# Patient Record
Sex: MALE | Race: WHITE | NOT HISPANIC OR LATINO | Employment: UNEMPLOYED | URBAN - METROPOLITAN AREA
[De-identification: names, ages, dates, MRNs, and addresses within clinical notes are randomized per-mention and may not be internally consistent; named-entity substitution may affect disease eponyms.]

---

## 2021-02-01 ENCOUNTER — HOSPITAL ENCOUNTER (EMERGENCY)
Facility: HOSPITAL | Age: 31
Discharge: OTHER INSTITUTION | End: 2021-02-03
Attending: EMERGENCY MEDICINE | Admitting: EMERGENCY MEDICINE
Payer: COMMERCIAL

## 2021-02-01 DIAGNOSIS — R45.851 SUICIDAL IDEATIONS: Primary | ICD-10-CM

## 2021-02-01 LAB
ALBUMIN SERPL BCP-MCNC: 4.8 G/DL (ref 3.5–5.7)
ALP SERPL-CCNC: 70 U/L (ref 40–150)
ALT SERPL W P-5'-P-CCNC: 26 U/L (ref 7–52)
AMPHETAMINES SERPL QL SCN: NEGATIVE
ANION GAP SERPL CALCULATED.3IONS-SCNC: 10 MMOL/L (ref 4–13)
AST SERPL W P-5'-P-CCNC: 18 U/L (ref 13–39)
BACTERIA UR QL AUTO: NORMAL /HPF
BARBITURATES UR QL: NEGATIVE
BASOPHILS # BLD AUTO: 0 THOUSANDS/ΜL (ref 0–0.1)
BASOPHILS NFR BLD AUTO: 0 % (ref 0–2)
BENZODIAZ UR QL: NEGATIVE
BILIRUB SERPL-MCNC: 0.5 MG/DL (ref 0.2–1)
BILIRUB UR QL STRIP: ABNORMAL
BUN SERPL-MCNC: 11 MG/DL (ref 7–25)
CALCIUM SERPL-MCNC: 9.6 MG/DL (ref 8.6–10.5)
CHLORIDE SERPL-SCNC: 105 MMOL/L (ref 98–107)
CLARITY UR: CLEAR
CO2 SERPL-SCNC: 24 MMOL/L (ref 21–31)
COCAINE UR QL: NEGATIVE
COLOR UR: ABNORMAL
CREAT SERPL-MCNC: 0.82 MG/DL (ref 0.7–1.3)
EOSINOPHIL # BLD AUTO: 0 THOUSAND/ΜL (ref 0–0.61)
EOSINOPHIL NFR BLD AUTO: 0 % (ref 0–5)
ERYTHROCYTE [DISTWIDTH] IN BLOOD BY AUTOMATED COUNT: 13.8 % (ref 11.5–14.5)
ETHANOL EXG-MCNC: 0 MG/DL
FLUAV RNA RESP QL NAA+PROBE: NEGATIVE
FLUBV RNA RESP QL NAA+PROBE: NEGATIVE
GFR SERPL CREATININE-BSD FRML MDRD: 119 ML/MIN/1.73SQ M
GLUCOSE SERPL-MCNC: 93 MG/DL (ref 65–99)
GLUCOSE UR STRIP-MCNC: NEGATIVE MG/DL
HCT VFR BLD AUTO: 42.3 % (ref 42–47)
HGB BLD-MCNC: 13.9 G/DL (ref 14–18)
HGB UR QL STRIP.AUTO: NEGATIVE
KETONES UR STRIP-MCNC: ABNORMAL MG/DL
LEUKOCYTE ESTERASE UR QL STRIP: NEGATIVE
LYMPHOCYTES # BLD AUTO: 1.7 THOUSANDS/ΜL (ref 0.6–4.47)
LYMPHOCYTES NFR BLD AUTO: 11 % (ref 21–51)
MCH RBC QN AUTO: 28.8 PG (ref 26–34)
MCHC RBC AUTO-ENTMCNC: 32.9 G/DL (ref 31–37)
MCV RBC AUTO: 88 FL (ref 81–99)
METHADONE UR QL: NEGATIVE
MONOCYTES # BLD AUTO: 1.2 THOUSAND/ΜL (ref 0.17–1.22)
MONOCYTES NFR BLD AUTO: 8 % (ref 2–12)
NEUTROPHILS # BLD AUTO: 12.6 THOUSANDS/ΜL (ref 1.4–6.5)
NEUTS SEG NFR BLD AUTO: 81 % (ref 42–75)
NITRITE UR QL STRIP: NEGATIVE
NON-SQ EPI CELLS URNS QL MICRO: NORMAL /HPF
OPIATES UR QL SCN: NEGATIVE
OXYCODONE+OXYMORPHONE UR QL SCN: NEGATIVE
PCP UR QL: NEGATIVE
PH UR STRIP.AUTO: 6.5 [PH]
PLATELET # BLD AUTO: 437 THOUSANDS/UL (ref 149–390)
PMV BLD AUTO: 7.6 FL (ref 8.6–11.7)
POTASSIUM SERPL-SCNC: 3.4 MMOL/L (ref 3.5–5.5)
PROT SERPL-MCNC: 7.5 G/DL (ref 6.4–8.9)
PROT UR STRIP-MCNC: ABNORMAL MG/DL
RBC # BLD AUTO: 4.82 MILLION/UL (ref 4.3–5.9)
RBC #/AREA URNS AUTO: NORMAL /HPF
RSV RNA RESP QL NAA+PROBE: NEGATIVE
SARS-COV-2 RNA RESP QL NAA+PROBE: NEGATIVE
SODIUM SERPL-SCNC: 139 MMOL/L (ref 134–143)
SP GR UR STRIP.AUTO: >=1.03 (ref 1–1.03)
THC UR QL: POSITIVE
UROBILINOGEN UR QL STRIP.AUTO: 1 E.U./DL
WBC # BLD AUTO: 15.5 THOUSAND/UL (ref 4.8–10.8)
WBC #/AREA URNS AUTO: NORMAL /HPF

## 2021-02-01 PROCEDURE — 99284 EMERGENCY DEPT VISIT MOD MDM: CPT | Performed by: EMERGENCY MEDICINE

## 2021-02-01 PROCEDURE — 36415 COLL VENOUS BLD VENIPUNCTURE: CPT | Performed by: EMERGENCY MEDICINE

## 2021-02-01 PROCEDURE — 80053 COMPREHEN METABOLIC PANEL: CPT | Performed by: EMERGENCY MEDICINE

## 2021-02-01 PROCEDURE — 82075 ASSAY OF BREATH ETHANOL: CPT | Performed by: EMERGENCY MEDICINE

## 2021-02-01 PROCEDURE — 0241U HB NFCT DS VIR RESP RNA 4 TRGT: CPT | Performed by: EMERGENCY MEDICINE

## 2021-02-01 PROCEDURE — 80307 DRUG TEST PRSMV CHEM ANLYZR: CPT | Performed by: EMERGENCY MEDICINE

## 2021-02-01 PROCEDURE — 85025 COMPLETE CBC W/AUTO DIFF WBC: CPT | Performed by: EMERGENCY MEDICINE

## 2021-02-01 PROCEDURE — 99285 EMERGENCY DEPT VISIT HI MDM: CPT

## 2021-02-01 PROCEDURE — 81001 URINALYSIS AUTO W/SCOPE: CPT | Performed by: EMERGENCY MEDICINE

## 2021-02-01 RX ORDER — LORAZEPAM 1 MG/1
1 TABLET ORAL ONCE
Status: COMPLETED | OUTPATIENT
Start: 2021-02-01 | End: 2021-02-01

## 2021-02-01 RX ADMIN — LORAZEPAM 1 MG: 1 TABLET ORAL at 18:53

## 2021-02-01 NOTE — LETTER
[facility]  [address] [id number] [phone]  Dept: [phone]      EMTALA TRANSFER CONSENT    NAME Delia Barthel DOB 1990                              MRN [id number]    I have been informed of my rights regarding examination, treatment, and transfer   by Dr Del Younger MD    Benefits: Specialized equipment and/or services available at the receiving facility (Include comment)________________________(I/P psych)    Risks: Potential for delay in receiving treatment      Consent for Transfer:  I acknowledge that my medical condition has been evaluated and explained to me by the emergency department physician or other qualified medical person and/or my attending physician, who has recommended that I be transferred to the service of  Accepting Physician: Dr Yemi Chapman at [address] [facility] [address], [address] : [address], [address]    ([phone])  The above potential benefits of such transfer, the potential risks associated with such transfer, and the probable risks of not being transferred have been explained to me, and I fully understand them  The doctor has explained that, in my case, the benefits of transfer outweigh the risks  I agree to be transferred  I authorize the performance of emergency medical procedures and treatments upon me in both transit and upon arrival at the receiving facility  Additionally, I authorize the release of any and all medical records to the receiving facility and request they be transported with me, if possible  I understand that the safest mode of transportation during a medical emergency is an ambulance and that the Hospital advocates the use of this mode of transport   Risks of traveling to the receiving facility by car, including absence of medical control, life sustaining equipment, such as oxygen, and medical personnel has been explained to me and I fully understand them  (ROSSANA CORRECT BOX BELOW)  [  ]  I consent to the stated transfer and to be transported by ambulance/helicopter  [  ]  I consent to the stated transfer, but refuse transportation by ambulance and accept full responsibility for my transportation by car  I understand the risks of non-ambulance transfers and I exonerate the Hospital and its staff from any deterioration in my condition that results from this refusal     X___________________________________________    DATE  21  TIME________  Signature of patient or legally responsible individual signing on patient behalf           RELATIONSHIP TO PATIENT_________________________          Provider Certification    NAME Debbie Tejada                                         1990                              MRN [id number]    A medical screening exam was performed on the above named patient  Based on the examination:    Condition Necessitating Transfer There were no encounter diagnoses      Patient Condition: The patient has been stabilized such that within reasonable medical probability, no material deterioration of the patient condition or the condition of the unborn child(jorge) is likely to result from the transfer    Reason for Transfer: No bed available at level of patient's needs    Transfer Requirements: [address] [place], [place], [address]    ([phone])   · Space available and qualified personnel available for treatment as acknowledged by Beula Decree  [phone]  · Agreed to accept transfer and to provide appropriate medical treatment as acknowledged by       Dr Geoffrey Ramos  · Appropriate medical records of the examination and treatment of the patient are provided at the time of transfer   [address] [facility],[address] _______  · Transfer will be performed by qualified personnel from [address] [place] [address] [facility]  [phone]  and appropriate transfer equipment as required, including the use of necessary and appropriate life support measures  Provider Certification: I have examined the patient and explained the following risks and benefits of being transferred/refusing transfer to the patient/family:  General risk, such as traffic hazards, adverse weather conditions, rough terrain or turbulence, possible failure of equipment (including vehicle or aircraft), or consequences of actions of persons outside the control of the transport personnel, The patient is stable for psychiatric transfer because they are medically stable, and is protected from harming him/herself or others during transport      Based on these reasonable risks and benefits to the patient and/or the unborn child(jorge), and based upon the information available at the time of the patients examination, I certify that the medical benefits reasonably to be expected from the provision of appropriate medical treatments at another medical facility outweigh the increasing risks, if any, to the individuals medical condition, and in the case of labor to the unborn child, from effecting the transfer      X____________________________________________ DATE 02/03/21        TIME_______      ORIGINAL - SEND TO MEDICAL RECORDS   COPY - SEND WITH PATIENT DURING TRANSFER

## 2021-02-01 NOTE — ED PROVIDER NOTES
History  Chief Complaint   Patient presents with    Psychiatric Evaluation     pt having visual hallucinations , states never had before , feels paranoid like people are watching him and following him, states uses heroin in past and uses [employer] currently      28-year-old male presents emergency department noting auditory hallucinations, however he appears to be confabulating and psychotic  Patient notes that he is here because his state does want him    States he is currently going through issues with his 8th amendment rights  He notes that he was supposed to be involved in the [employer] or law enforcement however his mental health issues do not allow that  He notes that he has been off his medication for multiple months because he can not afford it, is using marijuana at times to try to help himself behave normally  States he currently lives with his girlfriend and children, so the ambulance was called because of worsening behavior at home  Patient notes that he thought that a lot of people are spying on him and following him from his home to the hospital while he was in the ambulance  None       Past Medical History:   Diagnosis Date    Schizo-affective schizophrenia [facility])        History reviewed  No pertinent surgical history  History reviewed  No pertinent family history  I have reviewed and agree with the history as documented  E-Cigarette/Vaping     E-Cigarette/Vaping Substances     Social History     Tobacco Use    Smoking status: Current Every Day Smoker     Packs/day: 0 25    Smokeless tobacco: Never Used   Substance Use Topics    Alcohol use: Not Currently    Drug use: Yes     Types: Marijuana, Heroin       Review of Systems   Constitutional: Positive for activity change  HENT: Negative for congestion and sneezing  Respiratory: Negative for chest tightness  Gastrointestinal: Negative for abdominal distention and abdominal pain     Genitourinary: Negative for difficulty urinating  Musculoskeletal: Negative for arthralgias and back pain  Neurological: Negative for facial asymmetry  Psychiatric/Behavioral: Positive for behavioral problems and hallucinations  The patient is nervous/anxious  The patient is not hyperactive  Physical Exam  Physical Exam  Constitutional:       Appearance: Normal appearance  HENT:      Head: Normocephalic and atraumatic  Mouth/Throat:      Mouth: Mucous membranes are moist    Eyes:      Extraocular Movements: Extraocular movements intact  Cardiovascular:      Rate and Rhythm: Regular rhythm  Tachycardia present  Comments: Heart rate 101  Pulmonary:      Effort: Pulmonary effort is normal       Breath sounds: Normal breath sounds  Abdominal:      General: Abdomen is flat  Bowel sounds are normal    Musculoskeletal: Normal range of motion  Skin:     General: Skin is warm and dry  Capillary Refill: Capillary refill takes less than 2 seconds  Neurological:      Mental Status: He is alert  He is disoriented  Cranial Nerves: No cranial nerve deficit  Sensory: No sensory deficit  Psychiatric:      Comments: Patient appears have episodes of paranoia appears to be psychotic at times  Patient continues to talk in hurried type of speech regarding his rights, his mental illness, as well as involvement with the [employer], [employer] and long for splint  Vital Signs  ED Triage Vitals   Temp Pulse Resp BP SpO2   -- -- -- -- --      Temp src Heart Rate Source Patient Position - Orthostatic VS BP Location FiO2 (%)   -- -- -- -- --      Pain Score       --           There were no vitals filed for this visit        Visual Acuity      ED Medications  Medications   LORazepam (ATIVAN) tablet 1 mg (1 mg Oral Given 2/1/21 1853)       Diagnostic Studies  Results Reviewed     Procedure Component Value Units Date/Time    COVID19, Influenza A/B, RSV PCR, [facility] [[id number]]  (Normal) Collected: 02/01/21 1851    Lab Status: Final result Specimen: Nares from Nasopharyngeal Swab Updated: 02/01/21 1936     SARS-CoV-2 Negative     INFLUENZA A PCR Negative     INFLUENZA B PCR Negative     RSV PCR Negative    Narrative: This test has been authorized by FDA under an EUA (Emergency Use Assay) for use by authorized laboratories  Clinical caution and judgement should be used with the interpretation of these results with consideration of the clinical impression and other laboratory testing  Testing reported as "Positive" or "Negative" has been proven to be accurate according to standard laboratory validation requirements  All testing is performed with control materials showing appropriate reactivity at standard intervals  Rapid drug screen, urine [[id number]]  (Abnormal) Collected: 02/01/21 1852    Lab Status: Final result Specimen: Urine, Clean Catch Updated: 02/01/21 1922     Amph/Meth UR Negative     Barbiturate Ur Negative     Benzodiazepine Urine Negative     Cocaine Urine Negative     Methadone Urine Negative     Opiate Urine Negative     PCP Ur Negative     THC Urine Positive     Oxycodone Urine Negative    Narrative:      Presumptive report  If requested, specimen will be sent to reference lab for confirmation  FOR MEDICAL PURPOSES ONLY  IF CONFIRMATION NEEDED PLEASE CONTACT THE LAB WITHIN 5 DAYS      Drug Screen Cutoff Levels:  AMPHETAMINE/METHAMPHETAMINES  1000 ng/mL  BARBITURATES     200 ng/mL  BENZODIAZEPINES     200 ng/mL  COCAINE      300 ng/mL  METHADONE      300 ng/mL  OPIATES      300 ng/mL  PHENCYCLIDINE     25 ng/mL  THC       50 ng/mL  OXYCODONE      100 ng/mL    Comprehensive metabolic panel [[id number]]  (Abnormal) Collected: 02/01/21 1852    Lab Status: Final result Specimen: Blood from Arm, Left Updated: 02/01/21 1922     Sodium 139 mmol/L      Potassium 3 4 mmol/L      Chloride 105 mmol/L      CO2 24 mmol/L      ANION GAP 10 mmol/L      BUN 11 mg/dL      Creatinine 0 82 mg/dL      Glucose 93 mg/dL      Calcium 9 6 mg/dL      AST 18 U/L      ALT 26 U/L      Alkaline Phosphatase 70 U/L      Total Protein 7 5 g/dL      Albumin 4 8 g/dL      Total Bilirubin 0 50 mg/dL      eGFR 119 ml/min/1 73sq m     Narrative:      [place] guidelines for Chronic Kidney Disease (CKD):     Stage 1 with normal or high GFR (GFR > 90 mL/min/1 73 square meters)    Stage 2 Mild CKD (GFR = 60-89 mL/min/1 73 square meters)    Stage 3A Moderate CKD (GFR = 45-59 mL/min/1 73 square meters)    Stage 3B Moderate CKD (GFR = 30-44 mL/min/1 73 square meters)    Stage 4 Severe CKD (GFR = 15-29 mL/min/1 73 square meters)    Stage 5 End Stage CKD (GFR <15 mL/min/1 73 square meters)  Note: GFR calculation is accurate only with a steady state creatinine    Urine Microscopic [[id number]]  (Normal) Collected: 02/01/21 1852    Lab Status: Final result Specimen: Urine, Clean Catch Updated: 02/01/21 1909     RBC, UA None Seen /hpf      WBC, UA 0-1 /hpf      Epithelial Cells Occasional /hpf      Bacteria, UA None Seen /hpf     CBC and differential [[id number]]  (Abnormal) Collected: 02/01/21 1852    Lab Status: Final result Specimen: Blood from Arm, Left Updated: 02/01/21 1902     WBC 15 50 Thousand/uL      RBC 4 82 Million/uL      Hemoglobin 13 9 g/dL      Hematocrit 42 3 %      MCV 88 fL      MCH 28 8 pg      MCHC 32 9 g/dL      RDW 13 8 %      MPV 7 6 fL      Platelets 207 Thousands/uL      Neutrophils Relative 81 %      Lymphocytes Relative 11 %      Monocytes Relative 8 %      Eosinophils Relative 0 %      Basophils Relative 0 %      Neutrophils Absolute 12 60 Thousands/µL      Lymphocytes Absolute 1 70 Thousands/µL      Monocytes Absolute 1 20 Thousand/µL      Eosinophils Absolute 0 00 Thousand/µL      Basophils Absolute 0 00 Thousands/µL     UA (URINE) with reflex to Scope [[id number]]  (Abnormal) Collected: 02/01/21 1852    Lab Status: Final result Specimen: Urine, Clean Catch Updated: 02/01/21 1902     Color, UA Thais     Clarity, UA Clear     Specific Gravity, UA >=1 030     pH, UA 6 5     Leukocytes, UA Negative     Nitrite, UA Negative     Protein, UA Trace mg/dl      Glucose, UA Negative mg/dl      Ketones, UA 40 (2+) mg/dl      Urobilinogen, UA 1 0 E U /dl      Bilirubin, UA 1+     Blood, UA Negative    POCT alcohol breath test [[id number]]  (Normal) Resulted: 02/01/21 1836    Lab Status: Final result Updated: 02/01/21 1836     EXTBreath Alcohol 0 000                 No orders to display              Procedures  Procedures         ED Course  ED Course as of Feb 01 1953   [facility] Feb 01, 2021   1843 Patient requesting medication to calm him down before he escalates  Ativan 1 mg p o  ordered      1940 THC URINE(!): Positive   1940 WBC(!): 15 50   1952 Case signed out to Dr Nikki Villegas pending [facility] placement  MDM    Disposition  Final diagnoses:   None     ED Disposition     None      MD Documentation      Most Recent Value   Sending MD Gia Campos OD      Follow-up Information    None         Patient's Medications    No medications on file     No discharge procedures on file      PDMP Review     None          ED Provider  Electronically Signed by           Ismael Álvarez DO  02/03/21 3865

## 2021-02-02 ENCOUNTER — APPOINTMENT (EMERGENCY)
Dept: RADIOLOGY | Facility: HOSPITAL | Age: 31
End: 2021-02-02
Payer: COMMERCIAL

## 2021-02-02 PROCEDURE — 99243 OFF/OP CNSLTJ NEW/EST LOW 30: CPT | Performed by: NURSE PRACTITIONER

## 2021-02-02 PROCEDURE — 73630 X-RAY EXAM OF FOOT: CPT

## 2021-02-02 RX ORDER — QUETIAPINE FUMARATE 50 MG/1
100 TABLET, FILM COATED ORAL ONCE
Status: COMPLETED | OUTPATIENT
Start: 2021-02-02 | End: 2021-02-02

## 2021-02-02 RX ORDER — LORAZEPAM 1 MG/1
2 TABLET ORAL ONCE
Status: COMPLETED | OUTPATIENT
Start: 2021-02-02 | End: 2021-02-02

## 2021-02-02 RX ADMIN — LORAZEPAM 2 MG: 1 TABLET ORAL at 10:07

## 2021-02-02 RX ADMIN — LORAZEPAM 2 MG: 1 TABLET ORAL at 18:59

## 2021-02-02 RX ADMIN — QUETIAPINE FUMARATE 100 MG: 50 TABLET ORAL at 10:06

## 2021-02-02 NOTE — ED NOTES
Bed search:    [facility], no beds  [facility], clinical faxed for review   [address], no beds  [facility] Sher[facility] Garcia, no beds  [facility], clinical faxed for review  [address], no beds  [facility], no beds  [facility] [place], no beds  [place], no beds  [place], no beds  [address], no beds  [facility], no beds  [address], no beds  [facility] [place]- no answer, left VM  [place], no beds  [place], no beds  [place], no beds  [facility]- Coco Arellano, no beds  [facility] Ayers, no beds  [facility], no beds      Clinical faxed to [facility] [place] for review

## 2021-02-02 NOTE — ED NOTES
Pt given phone  Pt assisted with dialing numbers  Pt stated that "it seems pretty conspicuous that every time I dial this number THAT LASHAWN [address] that it goes to a [employer] VOICEMAIL  I know you people are listening to my personal conversations" I stated to patient that when I dial the number it goes to a [address] [employer], [address]? Voicemail  Pt states , "oh that's my grandma"  Phone taken from patient at risk of dialing inappropriate numbers        Jadyn Higgins RN  02/01/21 2023

## 2021-02-02 NOTE — ED NOTES
Met with patient and completed the crisis intake assessment as well as the safety risk assessment  Patient arrived to the ER via EMS from  Patient states that his partner called 911   [place] been off my meds for months, she says my behaviors and I need to get help   Patient appears to be paranoid  with delusional thinking  Patient keeps repeating that  Im here because the state wants me, but after I get help    Patient also states that he is having issues with the 8th amendment rights and people trying to take his rights from him  Patient believes he is supposed to be involved with the [facility] or the police, but his mental health issues are holding him back, but as soon as he is discharged they will be here for him  Patient admits to visual hallucinations but will no further disclose  Patient does admit to disturbances with sleep and appetite as well as concentration and motivation  Patient in agreement to sign a voluntary admission, voluntary rights and 72 hour notice explained to patient  Patient verbalized and understanding  A copy of both were placed on patients chart  Bed search and insurance in progress

## 2021-02-02 NOTE — ED NOTES
[place] Suicide Risk Assessment deferred, as unable to assess while patient sleeping  Behavioral Health Assessment deferred as patient is sleeping and would benefit from additional rest   Vital signs deferred until patient awake, no signs or symptoms of respiratory distress at this time  Once patient is awake and able to participate, will complete assessments         Maria De Jesus Sanderson RN  02/02/21 9772

## 2021-02-02 NOTE — ED NOTES
[place] Suicide Risk Assessment deferred, as unable to assess while patient sleeping  Behavioral Health Assessment deferred as patient is sleeping and would benefit from additional rest   Vital signs deferred until patient awake, no signs or symptoms of respiratory distress at this time  Once patient is awake and able to participate, will complete assessments         Jace Magaña RN  02/02/21 [facility] Louretta Madura RN  02/02/21 1007

## 2021-02-02 NOTE — ED NOTES
[facility] Suicide Risk Assessment deferred, as unable to assess while patient sleeping  Behavioral Health Assessment deferred as patient is sleeping and would benefit from additional rest   Vital signs deferred until patient awake, no signs or symptoms of respiratory distress at this time  Once patient is awake and able to participate, will complete assessments         Navarro Lerner RN  02/02/21 8235

## 2021-02-02 NOTE — CONSULTS
Consultation - Behavioral Health     Identification Data: Debbie Tejada 35 y o  male MRN: [id number]  Unit/Bed#: Z1 H1 Encounter: [id number]    02/02/21  4:24 PM    Consults  Physician Requesting Consult: No att  providers found  Principal Problem: Exacerbation of Schizoaffective Disorder    Reason for Consult:  "I've been off my meds for months "    History of Present Illness     Debbie Tejada is a 35 y o  male with a history of Schizoaffective Disorder who was admitted to the medical service on 2/1/2021 due to acute psychosis  Psychiatric consultation was requested due to patient being in the emergency room for longer than 24 hours while waiting for an available bed  Per crisis evaluation completed by Crisis Worker Wilmer Ordonez:    Met with patient and completed the crisis intake assessment as well as the safety risk assessment       Patient arrived to the ER via EMS from  Patient states that his partner called 911   Conchita Jackson been off my meds for months, she says my behaviors and I need to get help   Patient appears to be paranoid  with delusional thinking  Patient keeps repeating that  Im here because the state wants me, but after I get help    Patient also states that he is having issues with the 8th amendment rights and people trying to take his rights from him  Patient believes he is supposed to be involved with the [facility] or the police, but his mental health issues are holding him back, but as soon as he is discharged they will be here for him  Patient admits to visual hallucinations but will no further disclose  Patient does admit to disturbances with sleep and appetite as well as concentration and motivation       Patient in agreement to sign a voluntary admission, voluntary rights and 72 hour notice explained to patient  Patient verbalized and understanding   A copy of both were placed on patients chart      Bed search and insurance in progress    On psychiatric consultation Debbie Tejada is disheveled  He is dressed in hospital clothing and is unkempt  He is a poor historian  When asked what brought him to the hospital, he relates he believed he was being watched so he called his handler who advised him to go to the hospital for help  He states he is on a special short list of people who are assigned a handler and are watched because they carry government secrets  He appeared paranoid and internally preoccupied  He kept looking around the room while he was talking  He stated he wants to talk without being judged   His speech was disorganized, tangential, pressured, and at times nonsensical   He was unable to participate in a meaningful conversation due to acute psychosis  He was perseverative regarding delusions  He did endorse auditory hallucinations but would not disclose content  He was able to tell me that he used to be on [place] which is prescribed by his primary care physician  He states he has not had his psychiatric medication in months and needs to have his injection as soon as possible  He does not currently have an outpatient psychiatrist   He did sign 201 commitment paperwork and is waiting for a psychiatric bed assignment        Psychiatric Review Of Systems:    sleep changes: yes  appetite changes: no  weight changes: no  energy/anergy: no  interest/pleasure/anhedonia: no  somatic symptoms: no  anxiety/panic: yes  claudia: yes  guilty/hopeless: no  self injurious behavior/risky behavior: no  Suicidal ideation: no  Homicidal ideation: no  Auditory hallucinations: preoccupied, appears responding to internal stimuli  Visual hallucinations: appears responding to internal stimuli  Other hallucinations: no  Delusional thinking: yes  Eating disorder history: no  Obsessive/compulsive symptoms: no    Historical Information     Past Psychiatric History:     Past Inpatient Psychiatric Treatment:   unable to obtain through patient interview/chart review  Past Outpatient Psychiatric Treatment:    Psychiatric medication administered through primary care physician  Any other history was unable to be obtained due to acute psychosis  Past Suicide Attempts: unknown  Past Violent Behavior: unknown  Past Psychiatric Medication Trials: Sly Watkins; otherwise unknown     Substance Abuse History:    Social History     Tobacco History     Smoking Status  Current Every Day Smoker Smoking Frequency  0 25 packs/day    Smokeless Tobacco Use  Never Used          Alcohol History     Alcohol Use Status  Not Currently          Drug Use     Drug Use Status  Yes Types  Heroin, Marijuana          Sexual Activity     Sexually Active  Not Asked          Activities of Daily Living    Not Asked                   Past Medical History:   Diagnosis Date    Schizo-affective schizophrenia ([facility] [address] )      History reviewed  No pertinent surgical history  Medical Review Of Systems:    Pertinent items are noted in HPI  Allergies: Allergies   Allergen Reactions    Dexamethasone Sodium Phosphate Itching    Mouthwashes Hives     Blisters    Tramadol Seizures     Seizure       Medications: All current active medications have been reviewed      Objective     Vital signs in last 24 hours:    Temp:  [98 5 °F (36 9 °C)] 98 5 °F (36 9 °C)  HR:  [105] 105  Resp:  [18] 18  BP: (149)/(100) 149/100  No intake or output data in the 24 hours ending 02/02/21 1624    Mental Status Evaluation:    Appearance:  disheveled, marginal hygiene   Behavior:  bizarre, restless   Speech:  pressured, hypertalkative   Mood:  anxious   Affect:  reactive   Language: fluent   Thought Process:  disorganized, illogical, circumstantial, tangential   Associations: loose associations   Thought Content:  grandiose and persecutory delusions, racing thoughts, paranoid ideation   Perceptual Disturbances: auditory hallucinations   Risk Potential: Suicidal ideation - None  Homicidal ideation - None  Potential for aggression - No   Sensorium: oriented to person, place and time/date   Memory:  recent and remote memory grossly intact   Consciousness:  alert and awake    Attention: poor concentration and poor attention span   Intellect: not examined   Fund of Knowledge: Not assessed   Insight:  poor   Judgment: poor   Muscle Strength Muscle Tone: normal  normal   Gait/Station: normal gait/station, normal balance   Motor Activity: no abnormal movements     Laboratory Results: I have personally reviewed all pertinent laboratory/tests results  Imaging Studies: Xr Foot 3+ Views Right    Result Date: 2/2/2021  Narrative: RIGHT FOOT INDICATION:   Trauma  COMPARISON:  None VIEWS:  XR FOOT 3+ VW RIGHT Images: 3 FINDINGS: There is no acute fracture or dislocation  No significant degenerative changes  No lytic or blastic osseous lesion  Soft tissues are unremarkable  Impression: No acute osseous abnormality  Workstation performed: [id number]       Code Status: No Order  Advance Directive and Living Will:       Power of :      Assessment/Plan         Assessment:  Active Problems:    * No active hospital problems  *          Treatment Plan:     Admit to inpatient adult behavioral health unit for stabilization and medication management  Patient is agreeable to plan  Bed search in progress        Risks / Benefits of Treatment:    N/A    Counseling / Coordination of Care:    Patient's presentation on admission and proposed treatment plan discussed with treatment team     BHAVESH Felix 02/02/21

## 2021-02-03 VITALS
DIASTOLIC BLOOD PRESSURE: 87 MMHG | HEART RATE: 68 BPM | OXYGEN SATURATION: 99 % | WEIGHT: 160 LBS | TEMPERATURE: 98.6 F | SYSTOLIC BLOOD PRESSURE: 125 MMHG | RESPIRATION RATE: 18 BRPM

## 2021-02-03 PROBLEM — F29 PSYCHOSIS (HCC): Status: ACTIVE | Noted: 2021-02-03

## 2021-02-03 PROCEDURE — 99213 OFFICE O/P EST LOW 20 MIN: CPT | Performed by: PHYSICIAN ASSISTANT

## 2021-02-03 RX ORDER — LORAZEPAM 1 MG/1
2 TABLET ORAL ONCE
Status: COMPLETED | OUTPATIENT
Start: 2021-02-03 | End: 2021-02-03

## 2021-02-03 RX ADMIN — LORAZEPAM 2 MG: 1 TABLET ORAL at 10:38

## 2021-02-03 NOTE — ED NOTES
Spoke with Kosta Price at [employer] [employer], transport arranged with [facility] p/u at Amarilis Nassar at [facility] aware of ETA

## 2021-02-03 NOTE — ED NOTES
Pt lying in hallway bed  Calm and awake  No needs at this time       Caryn French, ELISSA  02/03/21 7452

## 2021-02-03 NOTE — ED NOTES
EMTALA completed and signatures obtained  Pt, RN and attending all aware of disposition and p/u time      All appropriate paperwork ready for transfer

## 2021-02-03 NOTE — ED NOTES
Pt making multiple phone calls on hospital phone  Offering no complaints at this time   Calm and cooperative     Gigi Washington RN  02/03/21 5142

## 2021-02-03 NOTE — ED NOTES
Patient is accepted at [facility]   Patient is accepted by Dr Chidi Cardenas per [address] is arranged with TBD  Transportation is scheduled for TBD  Patient may go to the floor after 8am      [facility] is working on transport, will follow up once transportation is scheduled

## 2021-02-03 NOTE — ED NOTES
Bed Search Efforts      [facility]- Per Raghavendra, no male beds at this time  Radha- Per Noé Mcleod, no more male beds at this time   Kadi Ziegler- Per Jonatan Telles,  willing to review, chart faxed at this time   Taylor- Per Joao Barrios, no more adult male beds at this time  Renato- Per Luis Randall, willing to review, chart faxed at this time   Natasha- Per Lorenzo, willing to review, chart faxed at this time

## 2021-02-03 NOTE — ED NOTES
Nancy Suicide Risk Assessment deferred, as unable to assess while patient sleeping  Behavioral Health Assessment deferred as patient is sleeping and would benefit from additional rest   Vital signs deferred until patient awake, no signs or symptoms of respiratory distress at this time  Once patient is awake and able to participate, will complete assessments         Adan Bertradn RN  02/03/21 6066

## 2021-02-03 NOTE — ED NOTES
Nancy Suicide Risk Assessment deferred, as unable to assess while patient sleeping  Behavioral Health Assessment deferred as patient is sleeping and would benefit from additional rest   Vital signs deferred until patient awake, no signs or symptoms of respiratory distress at this time  Once patient is awake and able to participate, will complete assessments         Radha Chauhan RN  02/03/21 8877

## 2021-02-03 NOTE — ED NOTES
Pt advised that he is no longer allowed to use the phone today  Per registration patient is dialing multiple numbers and harassing the         Omkar Rodriguez RN  02/03/21 0458

## 2021-02-03 NOTE — PROGRESS NOTES
Telepsych Follow Up - [facility] 35 y o  male MRN: [id number]  Unit/Bed#: ED 04 Encounter: [id number]      REQUIRED DOCUMENTATION:     1  This service was provided via Telemedicine  2  Provider located at [place] [address] provider: Lisset Gottlieb PA-C   4  Identify all parties in room with patient during tele consult:  Delia Barthel, patient  5  After connecting through televideo, patient was identified by name and date of birth and assistant checked wristband  Patient was then informed that this was a Telemedicine visit and that the exam was being conducted confidentially over secure lines  My office door was closed  No one else was in the room  Patient acknowledged consent and understanding of privacy and security of the Telemedicine visit, and gave us permission to have the assistant stay in the room in order to assist with the history and to conduct the exam   I informed the patient that I have reviewed their record in Epic and presented the opportunity for them to ask any questions regarding the visit today  The patient agreed to participate  Behavior over the last 24 hours: unchanged  Maurizio Perera Is a 41-year-old male with a history of schizoaffective disorder who presents for psychiatric follow-up via telemedicine  Patient was seen for his initial psychiatric consultation yesterday, see prior note  Maurizio Perera remains disheveled and unkempt  He is visibly paranoid and suspicious and asks several times to see bad identification of this interviewer  His speech is rambling, hyperverbal, tangential and bizarre  He is perseverative that his "8th amendment still rights have been violated  "  He endorses paranoid and persecutory delusions and believes that the police and government are after him  He states that he is "under watch because I sold my neighbors some weed  "  Also endorses some somatic delusions of lipomas on his toes, knees, hips and back  Adamantly denies SI and HI  Endorses previous auditory hallucinations but does not elaborate and denies visual hallucinations  Was previously taking [place] with good effect, but stopped at an unknown date  He is currently awaiting placement      Sleep: normal  Appetite: normal  Medication side effects: No   ROS: reports "lipomas on my lower body", all other systems are negative    Mental Status Evaluation:    Appearance:  disheveled, looks stated age, bearded   Behavior:  cooperative, calm, bizarre   Speech:  pressured, hypertalkative, tangential, disorganized   Mood:  euthymic   Affect:  flat   Thought Process:  disorganized, tangential, increased rate of thoughts, impaired abstract reasoning   Associations: tangential associations   Thought Content:  persecutory, paranoid and somatic delusions, grandiose ideas   Perceptual Disturbances: denies auditory or visual hallucinations when asked, but appears distracted   Risk Potential: Suicidal ideation - None at present  Homicidal ideation - None at present  Potential for aggression - No   Sensorium:  oriented to person, place and time/date   Memory:  recent and remote memory grossly intact   Consciousness:  alert and awake   Attention/Concentration: attention span and concentration appear shorter than expected for age   Insight:  poor   Judgment: impaired due to psychosis   Gait/Station: in bed   Motor Activity: no abnormal movements     Vital signs in last 24 hours:    Temp:  [98 9 °F (37 2 °C)] 98 9 °F (37 2 °C)  HR:  [79] 79  Resp:  [18] 18  BP: (137)/(71) 137/71    Laboratory results: I have personally reviewed all pertinent laboratory/tests results    Most Recent Labs:   Lab Results   Component Value Date    WBC 15 50 (H) 02/01/2021    RBC 4 82 02/01/2021    HGB 13 9 (L) 02/01/2021    HCT 42 3 02/01/2021     (H) 02/01/2021    RDW 13 8 02/01/2021    NEUTROABS 12 60 (H) 02/01/2021    SODIUM 139 02/01/2021    K 3 4 (L) 02/01/2021     02/01/2021    CO2 24 02/01/2021    BUN 11 02/01/2021    CREATININE 0 82 02/01/2021    GLUC 93 02/01/2021    CALCIUM 9 6 02/01/2021    AST 18 02/01/2021    ALT 26 02/01/2021    ALKPHOS 70 02/01/2021    TP 7 5 02/01/2021    ALB 4 8 02/01/2021    TBILI 0 50 02/01/2021    HGBA1C 5 4 04/23/2020     04/23/2020       Progress Toward Goals: remains bizarre, disorganized, paranoid; delusional; appears actively psychotic but not agitated     Assessment/Plan   Active Problems:    Psychosis ([facility] [address] )      Recommended Treatment:     Planned medication and treatment changes: All current active medications have been reviewed  Encourage group therapy, milieu therapy and occupational therapy  Behavioral Health checks every 15 minutes    Patient declines restarting psychotropics, will defer that decision to admitting team   That being said, he apparently has previously done well on [place] injections  I would recommend starting him on Risperdal 1 mg b i d  to treat his psychosis, gradually titrating to 4-6 mg daily, and ideally transitioning to the [address] [place] [address] from there  Awaiting placement/transport  Risks / Benefits of Treatment:    Risks, benefits, and possible side effects of medications explained to patient and patient verbalizes understanding and agreement for treatment  Counseling / Coordination of Care:    Patient's progress discussed with staff in treatment team meeting  Medications, treatment progress and treatment plan reviewed with patient      Manuela Del Castillo PA-C 02/03/21

## 2021-02-03 NOTE — ED NOTES
Insurance Authorization for admission:   Phone call placed- to [facility]  Phone number: [phone]  Spoke to KAYLA Lynn      3 days approved  Level of care: Inpatient Psych 201  Review on 2/5   Authorization # upon arrival to treating facility  EVS (Eligibility Verification System) called - [phone]  Automated system indicates: active with [address], ID# [id number]    Insurance Authorization for Transportation:  TBD once transport is secured, if needed

## 2021-02-03 NOTE — ED NOTES
Nancy Suicide Risk Assessment deferred, as unable to assess while patient sleeping  Behavioral Health Assessment deferred as patient is sleeping and would benefit from additional rest   Vital signs deferred until patient awake, no signs or symptoms of respiratory distress at this time  Once patient is awake and able to participate, will complete assessments         Ahsan Joya RN  02/03/21 6609

## 2021-02-03 NOTE — ED CARE HANDOFF
Emergency Department Sign Out Note        Sign out and transfer of care from Dr Harsh Urbina  See Separate Emergency Department note  The patient, Deann Shaw, was evaluated by the previous provider for Psychosis  Workup Completed:  Psych labs: uds, covid, basic labs     ED Course / Workup Pending (followup):    2300  Progress note:     Pt remains stable  Vss  Exam unchanged  Pt signed in as 201, bed search in progress                                        Procedures  MDM    Disposition  Final diagnoses:   None     ED Disposition     None      MD Documentation      Most Recent Value   Sending MD Chad Meléndez OD      Follow-up Information    None       Patient's Medications    No medications on file     No discharge procedures on file         ED Provider  Electronically Signed by     Sandra Nguyen MD  02/03/21 2596

## 2021-02-03 NOTE — ED NOTES
Nancy Suicide Risk Assessment deferred, as unable to assess while patient sleeping  Behavioral Health Assessment deferred as patient is sleeping and would benefit from additional rest   Vital signs deferred until patient awake, no signs or symptoms of respiratory distress at this time  Once patient is awake and able to participate, will complete assessments         Steven Perez RN  02/03/21 9651

## 2021-06-23 ENCOUNTER — HOSPITAL ENCOUNTER (INPATIENT)
Facility: HOSPITAL | Age: 31
LOS: 5 days | Discharge: HOME | DRG: 750 | End: 2021-06-29
Attending: EMERGENCY MEDICINE | Admitting: PSYCHIATRY & NEUROLOGY
Payer: COMMERCIAL

## 2021-06-23 DIAGNOSIS — R45.851 SUICIDAL IDEATION: Primary | ICD-10-CM

## 2021-06-23 DIAGNOSIS — F11.10 HEROIN ABUSE (HCC): ICD-10-CM

## 2021-06-23 PROCEDURE — 99285 EMERGENCY DEPT VISIT HI MDM: CPT

## 2021-06-24 PROBLEM — Z00.8 MEDICAL CLEARANCE FOR PSYCHIATRIC ADMISSION: Status: ACTIVE | Noted: 2021-06-24

## 2021-06-24 PROBLEM — Z72.0 TOBACCO ABUSE: Chronic | Status: ACTIVE | Noted: 2021-06-24

## 2021-06-24 PROBLEM — F19.20 DRUG ABUSE AND DEPENDENCE (HCC): Chronic | Status: ACTIVE | Noted: 2021-06-24

## 2021-06-24 PROBLEM — F32.9 MAJOR DEPRESSIVE DISORDER: Status: ACTIVE | Noted: 2021-02-03

## 2021-06-24 LAB
AMPHETAMINES SERPL QL SCN: NEGATIVE
BARBITURATES UR QL: NEGATIVE
BENZODIAZ UR QL: NEGATIVE
COCAINE UR QL: NEGATIVE
ETHANOL EXG-MCNC: NEGATIVE MG/DL
METHADONE UR QL: NEGATIVE
OPIATES UR QL SCN: NEGATIVE
OXYCODONE+OXYMORPHONE UR QL SCN: NEGATIVE
PCP UR QL: NEGATIVE
SARS-COV-2 RNA RESP QL NAA+PROBE: NEGATIVE
THC UR QL: POSITIVE

## 2021-06-24 PROCEDURE — U0003 INFECTIOUS AGENT DETECTION BY NUCLEIC ACID (DNA OR RNA); SEVERE ACUTE RESPIRATORY SYNDROME CORONAVIRUS 2 (SARS-COV-2) (CORONAVIRUS DISEASE [COVID-19]), AMPLIFIED PROBE TECHNIQUE, MAKING USE OF HIGH THROUGHPUT TECHNOLOGIES AS DESCRIBED BY CMS-2020-01-R: HCPCS | Performed by: EMERGENCY MEDICINE

## 2021-06-24 PROCEDURE — 80307 DRUG TEST PRSMV CHEM ANLYZR: CPT | Performed by: EMERGENCY MEDICINE

## 2021-06-24 PROCEDURE — U0005 INFEC AGEN DETEC AMPLI PROBE: HCPCS | Performed by: EMERGENCY MEDICINE

## 2021-06-24 PROCEDURE — 99284 EMERGENCY DEPT VISIT MOD MDM: CPT | Performed by: EMERGENCY MEDICINE

## 2021-06-24 PROCEDURE — 80354 DRUG SCREENING FENTANYL: CPT | Performed by: EMERGENCY MEDICINE

## 2021-06-24 PROCEDURE — NC001 PR NO CHARGE: Performed by: STUDENT IN AN ORGANIZED HEALTH CARE EDUCATION/TRAINING PROGRAM

## 2021-06-24 PROCEDURE — 99253 IP/OBS CNSLTJ NEW/EST LOW 45: CPT | Performed by: PHYSICIAN ASSISTANT

## 2021-06-24 PROCEDURE — 82075 ASSAY OF BREATH ETHANOL: CPT | Performed by: EMERGENCY MEDICINE

## 2021-06-24 RX ORDER — BENZTROPINE MESYLATE 1 MG/1
1 TABLET ORAL
Status: DISCONTINUED | OUTPATIENT
Start: 2021-06-24 | End: 2021-06-29 | Stop reason: HOSPADM

## 2021-06-24 RX ORDER — MINERAL OIL AND PETROLATUM 150; 830 MG/G; MG/G
1 OINTMENT OPHTHALMIC
Status: DISCONTINUED | OUTPATIENT
Start: 2021-06-24 | End: 2021-06-29 | Stop reason: HOSPADM

## 2021-06-24 RX ORDER — MAGNESIUM HYDROXIDE/ALUMINUM HYDROXICE/SIMETHICONE 120; 1200; 1200 MG/30ML; MG/30ML; MG/30ML
30 SUSPENSION ORAL EVERY 4 HOURS PRN
Status: DISCONTINUED | OUTPATIENT
Start: 2021-06-24 | End: 2021-06-29 | Stop reason: HOSPADM

## 2021-06-24 RX ORDER — BENZTROPINE MESYLATE 1 MG/ML
1 INJECTION INTRAMUSCULAR; INTRAVENOUS
Status: DISCONTINUED | OUTPATIENT
Start: 2021-06-24 | End: 2021-06-29 | Stop reason: HOSPADM

## 2021-06-24 RX ORDER — LOPERAMIDE HCL 1 MG/7.5ML
2 SUSPENSION ORAL 3 TIMES DAILY PRN
Status: DISCONTINUED | OUTPATIENT
Start: 2021-06-24 | End: 2021-06-29 | Stop reason: HOSPADM

## 2021-06-24 RX ORDER — ACETAMINOPHEN 325 MG/1
650 TABLET ORAL EVERY 4 HOURS PRN
Status: DISCONTINUED | OUTPATIENT
Start: 2021-06-24 | End: 2021-06-29 | Stop reason: HOSPADM

## 2021-06-24 RX ORDER — HYDROXYZINE HYDROCHLORIDE 25 MG/1
25 TABLET, FILM COATED ORAL
Status: DISCONTINUED | OUTPATIENT
Start: 2021-06-24 | End: 2021-06-29 | Stop reason: HOSPADM

## 2021-06-24 RX ORDER — OLANZAPINE 10 MG/1
10 TABLET ORAL
Status: DISCONTINUED | OUTPATIENT
Start: 2021-06-24 | End: 2021-06-29 | Stop reason: HOSPADM

## 2021-06-24 RX ORDER — NICOTINE 21 MG/24HR
1 PATCH, TRANSDERMAL 24 HOURS TRANSDERMAL DAILY
Status: DISCONTINUED | OUTPATIENT
Start: 2021-06-24 | End: 2021-06-29 | Stop reason: HOSPADM

## 2021-06-24 RX ORDER — TRAZODONE HYDROCHLORIDE 50 MG/1
50 TABLET ORAL
Status: DISCONTINUED | OUTPATIENT
Start: 2021-06-24 | End: 2021-06-29 | Stop reason: HOSPADM

## 2021-06-24 RX ORDER — OLANZAPINE 5 MG/1
5 TABLET ORAL
Status: DISCONTINUED | OUTPATIENT
Start: 2021-06-24 | End: 2021-06-29 | Stop reason: HOSPADM

## 2021-06-24 RX ORDER — BACLOFEN 10 MG/1
10 TABLET ORAL 3 TIMES DAILY PRN
Status: DISCONTINUED | OUTPATIENT
Start: 2021-06-24 | End: 2021-06-29 | Stop reason: HOSPADM

## 2021-06-24 RX ORDER — ALPRAZOLAM 0.5 MG/1
2 TABLET ORAL ONCE
Status: COMPLETED | OUTPATIENT
Start: 2021-06-24 | End: 2021-06-24

## 2021-06-24 RX ORDER — OLANZAPINE 10 MG/1
5 INJECTION, POWDER, LYOPHILIZED, FOR SOLUTION INTRAMUSCULAR
Status: DISCONTINUED | OUTPATIENT
Start: 2021-06-24 | End: 2021-06-29 | Stop reason: HOSPADM

## 2021-06-24 RX ORDER — LANOLIN ALCOHOL/MO/W.PET/CERES
3 CREAM (GRAM) TOPICAL
Status: DISCONTINUED | OUTPATIENT
Start: 2021-06-24 | End: 2021-06-29 | Stop reason: HOSPADM

## 2021-06-24 RX ORDER — CLONIDINE HYDROCHLORIDE 0.1 MG/1
0.1 TABLET ORAL 2 TIMES DAILY PRN
Status: DISCONTINUED | OUTPATIENT
Start: 2021-06-24 | End: 2021-06-29 | Stop reason: HOSPADM

## 2021-06-24 RX ORDER — DICYCLOMINE HCL 20 MG
20 TABLET ORAL 3 TIMES DAILY PRN
Status: DISCONTINUED | OUTPATIENT
Start: 2021-06-24 | End: 2021-06-29 | Stop reason: HOSPADM

## 2021-06-24 RX ORDER — OLANZAPINE 2.5 MG/1
2.5 TABLET ORAL
Status: DISCONTINUED | OUTPATIENT
Start: 2021-06-24 | End: 2021-06-29 | Stop reason: HOSPADM

## 2021-06-24 RX ORDER — NICOTINE 21 MG/24HR
1 PATCH, TRANSDERMAL 24 HOURS TRANSDERMAL DAILY
Status: DISCONTINUED | OUTPATIENT
Start: 2021-06-25 | End: 2021-06-24

## 2021-06-24 RX ORDER — OLANZAPINE 10 MG/1
10 INJECTION, POWDER, LYOPHILIZED, FOR SOLUTION INTRAMUSCULAR
Status: DISCONTINUED | OUTPATIENT
Start: 2021-06-24 | End: 2021-06-29 | Stop reason: HOSPADM

## 2021-06-24 RX ORDER — HYDROXYZINE HYDROCHLORIDE 25 MG/1
50 TABLET, FILM COATED ORAL
Status: DISCONTINUED | OUTPATIENT
Start: 2021-06-24 | End: 2021-06-29 | Stop reason: HOSPADM

## 2021-06-24 RX ORDER — DIPHENHYDRAMINE HYDROCHLORIDE 50 MG/ML
50 INJECTION INTRAMUSCULAR; INTRAVENOUS EVERY 6 HOURS PRN
Status: DISCONTINUED | OUTPATIENT
Start: 2021-06-24 | End: 2021-06-29 | Stop reason: HOSPADM

## 2021-06-24 RX ORDER — HYDROXYZINE HYDROCHLORIDE 25 MG/1
100 TABLET, FILM COATED ORAL
Status: DISCONTINUED | OUTPATIENT
Start: 2021-06-24 | End: 2021-06-29 | Stop reason: HOSPADM

## 2021-06-24 RX ORDER — AMOXICILLIN 250 MG
1 CAPSULE ORAL DAILY PRN
Status: DISCONTINUED | OUTPATIENT
Start: 2021-06-24 | End: 2021-06-29 | Stop reason: HOSPADM

## 2021-06-24 RX ORDER — ACETAMINOPHEN 325 MG/1
650 TABLET ORAL EVERY 6 HOURS PRN
Status: DISCONTINUED | OUTPATIENT
Start: 2021-06-24 | End: 2021-06-29 | Stop reason: HOSPADM

## 2021-06-24 RX ORDER — ONDANSETRON 4 MG/1
4 TABLET, ORALLY DISINTEGRATING ORAL EVERY 6 HOURS PRN
Status: DISCONTINUED | OUTPATIENT
Start: 2021-06-24 | End: 2021-06-29 | Stop reason: HOSPADM

## 2021-06-24 RX ORDER — ACETAMINOPHEN 325 MG/1
975 TABLET ORAL EVERY 6 HOURS PRN
Status: DISCONTINUED | OUTPATIENT
Start: 2021-06-24 | End: 2021-06-29 | Stop reason: HOSPADM

## 2021-06-24 RX ADMIN — NICOTINE 1 PATCH: 21 PATCH, EXTENDED RELEASE TRANSDERMAL at 21:52

## 2021-06-24 RX ADMIN — ALPRAZOLAM 2 MG: 0.5 TABLET ORAL at 00:53

## 2021-06-24 RX ADMIN — OLANZAPINE 10 MG: 10 TABLET, FILM COATED ORAL at 17:24

## 2021-06-24 RX ADMIN — HYDROXYZINE HYDROCHLORIDE 100 MG: 25 TABLET, FILM COATED ORAL at 17:24

## 2021-06-24 NOTE — ED NOTES
Crisis met with Patient in [facility]  Patient is a 36 y/o male presenting to the ED following an intentional overdose of heroin  Patient said Nita Martinez took his kids because of his heroin abuse and was told he needs to seek treatment in order to get them back  He was clean for 3-6 months prior to relapsing 2-3 weeks ago  He denied any current suicidal/homicidal ideations and denied any visual/auditory hallucinations  Patient said was suicidal last night but not now  He reports increased depression/anxiety and decreased sleep/appetite  He has not had been on medication for 3 months because [facility] was no longer willing to treat him  Patient said he needs dual dx to get clean and to get his children back from [place]  Patient agreed to sign a 201 after rights and detailed explanation of the 72 hr notice were read to and and reviewed with him        Patient is requesting a facility be within about an 1 hr of [place] and does not want to go to [facility]   Crisis to f/u

## 2021-06-24 NOTE — ED NOTES
Patient is accepted at [facility]  Patient is accepted by Junaid Hawley per Chaparrita Dickerson            Nurse report is to be called to [phone]  prior to patient transfer

## 2021-06-24 NOTE — ED NOTES
Insurance Authorization for admission:   Phone call placed to [facility])   Phone number: [phone]  Spoke to Cordelia Barron   4 days approved  Level of care: 201 inpatient mental health treatment  Review on 66 28 21  Authorization # [id number]       EVS (Eligibility Verification System) called - [phone]    Automated system indicates: active with [facility] ([address] )

## 2021-06-24 NOTE — ED PROVIDER NOTES
History  Chief Complaint   Patient presents with    Drug Problem     Pt states he took "twenty bags" of heroine earlier today around 7p  Pt states he is currently depressed, not currently suicidal        Pt called crisis hotline for depression  He had expressed SI at that time, he denies this now    He has no somatic complaints    No other complaintws         History provided by:  Patient  Psychiatric Evaluation  Presenting symptoms: suicidal thoughts    Onset quality:  Unable to specify  Chronicity:  Recurrent  Context: drug abuse    Relieved by:  Nothing  Worsened by:  Nothing  Ineffective treatments:  None tried  Associated symptoms: no abdominal pain, no chest pain, no fatigue and no headaches        None       Past Medical History:   Diagnosis Date    Psychiatric illness     Schizo-affective schizophrenia ([facility] [address] )        History reviewed  No pertinent surgical history  Family History   Problem Relation Age of Onset    No Known Problems Mother     No Known Problems Father      I have reviewed and agree with the history as documented  E-Cigarette/Vaping    E-Cigarette Use Never User      E-Cigarette/Vaping Substances    Nicotine No     THC Yes     CBD No     Flavoring No     Other No     Unknown No      Social History     Tobacco Use    Smoking status: Current Every Day Smoker     Packs/day: 2 00    Smokeless tobacco: Never Used   Vaping Use    Vaping Use: Never used   Substance Use Topics    Alcohol use: Not Currently    Drug use: Yes     Types: Marijuana, Heroin       Review of Systems   Constitutional: Negative for chills, diaphoresis, fatigue and fever  Respiratory: Negative for cough, shortness of breath, wheezing and stridor  Cardiovascular: Negative for chest pain, palpitations and leg swelling  Gastrointestinal: Negative for abdominal pain, blood in stool, nausea and vomiting  Genitourinary: Negative for difficulty urinating, dysuria, flank pain and frequency  Musculoskeletal: Negative for arthralgias, back pain, gait problem, joint swelling, myalgias, neck pain and neck stiffness  Skin: Negative for rash and wound  Neurological: Negative for dizziness, light-headedness and headaches  Psychiatric/Behavioral: Positive for suicidal ideas  All other systems reviewed and are negative  Physical Exam  Physical Exam  Constitutional:       General: He is not in acute distress  Appearance: He is well-developed  He is not ill-appearing, toxic-appearing or diaphoretic  HENT:      Head: Normocephalic and atraumatic  Nose: Nose normal       Mouth/Throat:      Pharynx: No oropharyngeal exudate  Eyes:      General: No scleral icterus  Right eye: No discharge  Left eye: No discharge  Conjunctiva/sclera: Conjunctivae normal       Pupils: Pupils are equal, round, and reactive to light  Neck:      Vascular: No JVD  Trachea: No tracheal deviation  Cardiovascular:      Rate and Rhythm: Normal rate and regular rhythm  Heart sounds: Normal heart sounds  No murmur heard  No friction rub  No gallop  Pulmonary:      Effort: Pulmonary effort is normal  No respiratory distress  Breath sounds: Normal breath sounds  No stridor  No wheezing, rhonchi or rales  Chest:      Chest wall: No tenderness  Abdominal:      General: Bowel sounds are normal  There is no distension  Palpations: Abdomen is soft  There is no mass  Tenderness: There is no abdominal tenderness  There is no right CVA tenderness, left CVA tenderness, guarding or rebound  Hernia: No hernia is present  Musculoskeletal:         General: No swelling, tenderness, deformity or signs of injury  Normal range of motion  Cervical back: Normal range of motion and neck supple  No rigidity or tenderness  Right lower leg: No edema  Left lower leg: No edema  Lymphadenopathy:      Cervical: No cervical adenopathy     Skin:     General: Skin is warm       Capillary Refill: Capillary refill takes less than 2 seconds  Coloration: Skin is not jaundiced or pale  Findings: No bruising, erythema, lesion or rash  Neurological:      General: No focal deficit present  Mental Status: He is alert and oriented to person, place, and time  Mental status is at baseline  Cranial Nerves: No cranial nerve deficit  Sensory: No sensory deficit  Motor: No weakness or abnormal muscle tone  Coordination: Coordination normal    Psychiatric:         Behavior: Behavior normal          Thought Content:  Thought content normal          Judgment: Judgment normal          Vital Signs  ED Triage Vitals   Temperature Pulse Respirations Blood Pressure SpO2   06/23/21 2353 06/23/21 2353 06/23/21 2353 06/23/21 2353 06/23/21 2353   98 1 °F (36 7 °C) (!) 114 18 128/61 95 %      Temp Source Heart Rate Source Patient Position - Orthostatic VS BP Location FiO2 (%)   06/23/21 2353 06/23/21 2353 06/23/21 2353 06/23/21 2353 --   Temporal Monitor Lying Left arm       Pain Score       06/24/21 1700       No Pain           Vitals:    06/23/21 2353 06/24/21 1505   BP: 128/61 128/70   Pulse: (!) 114 90   Patient Position - Orthostatic VS: Lying Lying         Visual Acuity      ED Medications  Medications   hydrOXYzine HCL (ATARAX) tablet 25 mg (has no administration in time range)   hydrOXYzine HCL (ATARAX) tablet 50 mg (has no administration in time range)     Or   diphenhydrAMINE (BENADRYL) injection 50 mg (has no administration in time range)   hydrOXYzine HCL (ATARAX) tablet 100 mg (100 mg Oral Given 6/24/21 1724)   traZODone (DESYREL) tablet 50 mg (has no administration in time range)   senna-docusate sodium (SENOKOT S) 8 6-50 mg per tablet 1 tablet (has no administration in time range)   aluminum-magnesium hydroxide-simethicone (MYLANTA) oral suspension 30 mL (has no administration in time range)   artificial tear (LUBRIFRESH P M ) ophthalmic ointment 1 application (has no administration in time range)   OLANZapine (ZyPREXA) tablet 2 5 mg (has no administration in time range)   OLANZapine (ZyPREXA) tablet 5 mg (has no administration in time range)     Or   OLANZapine (ZyPREXA) IM injection 5 mg (has no administration in time range)   OLANZapine (ZyPREXA) tablet 10 mg (10 mg Oral Given 6/24/21 1724)     Or   OLANZapine (ZyPREXA) IM injection 10 mg ( Intramuscular See Alternative 6/24/21 1724)   benztropine (COGENTIN) tablet 1 mg (has no administration in time range)   benztropine (COGENTIN) injection 1 mg (has no administration in time range)   acetaminophen (TYLENOL) tablet 650 mg (has no administration in time range)   acetaminophen (TYLENOL) tablet 650 mg (has no administration in time range)   acetaminophen (TYLENOL) tablet 975 mg (has no administration in time range)   cloNIDine (CATAPRES) tablet 0 1 mg (has no administration in time range)   dicyclomine (BENTYL) tablet 20 mg (has no administration in time range)   loperamide (IMODIUM) oral liquid 2 mg (has no administration in time range)   ondansetron (ZOFRAN-ODT) dispersible tablet 4 mg (has no administration in time range)   baclofen tablet 10 mg (has no administration in time range)   melatonin tablet 3 mg (has no administration in time range)   nicotine (NICODERM CQ) 21 mg/24 hr TD 24 hr patch 1 patch (has no administration in time range)   ALPRAZolam (XANAX) tablet 2 mg (2 mg Oral Given 6/24/21 0053)       Diagnostic Studies  Results Reviewed     Procedure Component Value Units Date/Time    Darya Turner URINE [[id number]] Collected: 06/24/21 1041    Lab Status: In process Specimen: Urine, Other Updated: 06/24/21 1047    [facility] [[id number]]  (Normal) Collected: 06/24/21 0417    Lab Status: Final result Specimen: Nares from Nose Updated: 06/24/21 0517     SARS-CoV-2 Negative    Narrative:       The specimen collection materials, transport medium, and/or testing methodology utilized in the production of these test results have been proven to be reliable in a limited validation with an abbreviated program under the Emergency Utilization Authorization provided by the FDA  Testing reported as "Presumptive positive" will be confirmed with secondary testing to ensure result accuracy  Clinical caution and judgement should be used with the interpretation of these results with consideration of the clinical impression and other laboratory testing  Testing reported as "Positive" or "Negative" has been proven to be accurate according to standard laboratory validation requirements  All testing is performed with control materials showing appropriate reactivity at standard intervals  Rapid drug screen, urine [[id number]]  (Abnormal) Collected: 06/24/21 0017    Lab Status: Final result Specimen: Urine, Clean Catch Updated: 06/24/21 0035     Amph/Meth UR Negative     Barbiturate Ur Negative     Benzodiazepine Urine Negative     Cocaine Urine Negative     Methadone Urine Negative     Opiate Urine Negative     PCP Ur Negative     THC Urine Positive     Oxycodone Urine Negative    Narrative:      Presumptive report  If requested, specimen will be sent to reference lab for confirmation  FOR MEDICAL PURPOSES ONLY  IF CONFIRMATION NEEDED PLEASE CONTACT THE LAB WITHIN 5 DAYS      Drug Screen Cutoff Levels:  AMPHETAMINE/METHAMPHETAMINES  1000 ng/mL  BARBITURATES     200 ng/mL  BENZODIAZEPINES     200 ng/mL  COCAINE      300 ng/mL  METHADONE      300 ng/mL  OPIATES      300 ng/mL  PHENCYCLIDINE     25 ng/mL  THC       50 ng/mL  OXYCODONE      100 ng/mL    POCT alcohol breath test [[id number]]  (Normal) Resulted: 06/24/21 0014    Lab Status: Final result Updated: 06/24/21 0014     EXTBreath Alcohol Negative                 No orders to display              Procedures  Procedures         ED Course  ED Course as of Jun 24 2146   Thu Jun 24, 2021   0325 Pt resting comfortably       0325 Rapid drug screen, urine(!)   0325 EXTBreath Alcohol: Negative   0326 Pt medically cleared for psychiatric admission       0659 Sign out:     D/w Dr Sivan Mendoza  Will f/u on Crisis evaluation                                 SBIRT 22yo+      Most Recent Value   Initial Alcohol Screen: US AUDIT-C    1  How often do you have a drink containing alcohol?  0 Filed at: 06/24/2021 1506   2  How many drinks containing alcohol do you have on a typical day you are drinking? 0 Filed at: 06/24/2021 1506   3a  Male UNDER 65: How often do you have five or more drinks on one occasion? 0 Filed at: 06/24/2021 1506   3b  FEMALE Any Age, or MALE 65+: How often do you have 4 or more drinks on one occassion? 0 Filed at: 06/24/2021 1506   Audit-C Score  0 Filed at: 06/24/2021 1506   Full Alcohol Screen: US AUDIT   4  How often during the last year have you found that you were not able to stop drinking once you had started? 0 Filed at: 06/24/2021 1506   5  How often during past year have you failed to do what was normally expected of you because of drinking? 0 Filed at: 06/24/2021 1506   6  How often in past year have you needed a first drink in the morning to get yourself going after a heavy drinking session? 0 Filed at: 06/24/2021 1506   7  How often in past year have you had feeling of guilt or remorse after drinking? 0 Filed at: 06/24/2021 1506   8  How often in past year have you been unable to remember what happened night before because you had been drinking? 0 Filed at: 06/24/2021 1506   9  Have you or someone else been injured as a result of your drinking? 0 Filed at: 06/24/2021 1506   10   Has a relative, friend, doctor or other health worker been concerned about your drinking and suggested you cut down?   0 Filed at: 06/24/2021 1506   AUDIT Total Score  0 Filed at: 06/24/2021 1506                    MDM    Disposition  Final diagnoses:   Heroin abuse ([facility] [address] )   Suicidal ideation     Time reflects when diagnosis was documented in both MDM as applicable and the Disposition within this note     Time User Action Codes Description Comment    6/24/2021  3:26 AM Ortiz Cool Add [F11 10] Heroin abuse ([facility] [address] )     6/24/2021  3:27 AM Ortiz Cool Add [R45 851] Suicidal ideation     6/24/2021  1:18 PM Laura Velazco Modify [F11 10] Heroin abuse ([facility] [address] )     6/24/2021  1:18 PM Laura Velazco Modify [I92 134] Suicidal ideation       ED Disposition     ED Disposition Condition Date/Time Comment    Transfer to [facility] Jun 24, 2021  3:27 AM Theresa Weathers should be transferred out to [facility] and has been medically cleared  MD Documentation      Most Recent Value   Sending MD Margarito Closs, DO      Follow-up Information    None         There are no discharge medications for this patient  No discharge procedures on file      PDMP Review     None          ED Provider  Electronically Signed by           Katie Santos MD  06/24/21 Pj Woodruff MD  06/24/21 9101

## 2021-06-24 NOTE — PLAN OF CARE
Problem: Alteration in Thoughts and Perception  Goal: Treatment Goal: Gain control of psychotic behaviors/thinking, reduce/eliminate presenting symptoms and demonstrate improved reality functioning upon discharge  Outcome: Progressing  Goal: Verbalize thoughts and feelings  Description: Interventions:  - Promote a nonjudgmental and trusting relationship with the patient through active listening and therapeutic communication  - Assess patient's level of functioning, behavior and potential for risk  - Engage patient in 1 on 1 interactions  - Encourage patient to express fears, feelings, frustrations, and discuss symptoms    - Basye patient to reality, help patient recognize reality-based thinking   - Administer medications as ordered and assess for potential side effects  - Provide the patient education related to the signs and symptoms of the illness and desired effects of prescribed medications  Outcome: Progressing  Goal: Recognize dysfunctional thoughts, communicate reality-based thoughts at the time of discharge  Description: Interventions:  - Provide medication and psycho-education to assist patient in compliance and developing insight into his/her illness   Outcome: Progressing  Goal: Complete daily ADLs, including personal hygiene independently, as able  Description: Interventions:  - Observe, teach, and assist patient with ADLS  - Monitor and promote a balance of rest/activity, with adequate nutrition and elimination   Outcome: Progressing     Problem: Ineffective Coping  Goal: Understands least restrictive measures  Description: Interventions:  - Utilize least restrictive behavior  Outcome: Progressing     Problem: Individualized Interventions  Goal: Patient will verbalize appropriate use of telephone within 5 days  Description: Interventions:  - Treatment team to determine use of supervised phone privileges   Outcome: Progressing  Goal: Patient will verbalize need for hospitalization and will no longer attempt elopement within 5 days  Description: Interventions:  - Ongoing education to help patient understand need for hospitalization  Outcome: Progressing

## 2021-06-25 PROBLEM — F25.0 SCHIZOAFFECTIVE DISORDER, BIPOLAR TYPE (HCC): Status: ACTIVE | Noted: 2021-02-03

## 2021-06-25 LAB
ALBUMIN SERPL BCP-MCNC: 3.9 G/DL (ref 3.5–5.7)
ALP SERPL-CCNC: 61 U/L (ref 40–150)
ALT SERPL W P-5'-P-CCNC: 19 U/L (ref 7–52)
ANION GAP SERPL CALCULATED.3IONS-SCNC: 7 MMOL/L (ref 4–13)
AST SERPL W P-5'-P-CCNC: 17 U/L (ref 13–39)
BASOPHILS # BLD AUTO: 0.1 THOUSANDS/ΜL (ref 0–0.1)
BASOPHILS NFR BLD AUTO: 1 % (ref 0–2)
BILIRUB SERPL-MCNC: 0.3 MG/DL (ref 0.2–1)
BUN SERPL-MCNC: 10 MG/DL (ref 7–25)
CALCIUM SERPL-MCNC: 8.8 MG/DL (ref 8.6–10.5)
CHLORIDE SERPL-SCNC: 104 MMOL/L (ref 98–107)
CO2 SERPL-SCNC: 28 MMOL/L (ref 21–31)
CREAT SERPL-MCNC: 0.8 MG/DL (ref 0.7–1.3)
EOSINOPHIL # BLD AUTO: 0.2 THOUSAND/ΜL (ref 0–0.61)
EOSINOPHIL NFR BLD AUTO: 2 % (ref 0–5)
ERYTHROCYTE [DISTWIDTH] IN BLOOD BY AUTOMATED COUNT: 14.8 % (ref 11.5–14.5)
GFR SERPL CREATININE-BSD FRML MDRD: 121 ML/MIN/1.73SQ M
GLUCOSE P FAST SERPL-MCNC: 90 MG/DL (ref 65–99)
GLUCOSE SERPL-MCNC: 90 MG/DL (ref 65–99)
HCT VFR BLD AUTO: 40 % (ref 42–47)
HGB BLD-MCNC: 13.2 G/DL (ref 14–18)
LYMPHOCYTES # BLD AUTO: 2.8 THOUSANDS/ΜL (ref 0.6–4.47)
LYMPHOCYTES NFR BLD AUTO: 32 % (ref 21–51)
MCH RBC QN AUTO: 28.6 PG (ref 26–34)
MCHC RBC AUTO-ENTMCNC: 32.9 G/DL (ref 31–37)
MCV RBC AUTO: 87 FL (ref 81–99)
MONOCYTES # BLD AUTO: 0.8 THOUSAND/ΜL (ref 0.17–1.22)
MONOCYTES NFR BLD AUTO: 9 % (ref 2–12)
NEUTROPHILS # BLD AUTO: 5.1 THOUSANDS/ΜL (ref 1.4–6.5)
NEUTS SEG NFR BLD AUTO: 56 % (ref 42–75)
PLATELET # BLD AUTO: 549 THOUSANDS/UL (ref 149–390)
PMV BLD AUTO: 7 FL (ref 8.6–11.7)
POTASSIUM SERPL-SCNC: 3.9 MMOL/L (ref 3.5–5.5)
PROT SERPL-MCNC: 6.8 G/DL (ref 6.4–8.9)
RBC # BLD AUTO: 4.6 MILLION/UL (ref 4.3–5.9)
SODIUM SERPL-SCNC: 139 MMOL/L (ref 134–143)
TSH SERPL DL<=0.05 MIU/L-ACNC: 1.86 UIU/ML (ref 0.45–5.33)
WBC # BLD AUTO: 9 THOUSAND/UL (ref 4.8–10.8)

## 2021-06-25 PROCEDURE — 99222 1ST HOSP IP/OBS MODERATE 55: CPT | Performed by: HOSPITALIST

## 2021-06-25 PROCEDURE — 80053 COMPREHEN METABOLIC PANEL: CPT | Performed by: NURSE PRACTITIONER

## 2021-06-25 PROCEDURE — 85025 COMPLETE CBC W/AUTO DIFF WBC: CPT | Performed by: NURSE PRACTITIONER

## 2021-06-25 PROCEDURE — 84443 ASSAY THYROID STIM HORMONE: CPT | Performed by: NURSE PRACTITIONER

## 2021-06-25 RX ADMIN — NICOTINE 1 PATCH: 21 PATCH, EXTENDED RELEASE TRANSDERMAL at 08:14

## 2021-06-25 NOTE — SOCIAL WORK
06/25/21 1506   Patient Intake   Living Arrangement Apartment;Lives with someone  (Patient lives at home in his apartment with his girlfriend and their two children  Patient is able to return home once stable )   Can patient return home? Yes   Address to be Discharge to: [address] [place] [address],[address], [facility] [place] Lezama Said   Patient's Telephone Number [phone] (SO's number)   Access to Firearms No   Work History Disabled  (Collects SSI $805/month)   School Grade/Year Other (Comment)  (Patient completed 11th grade in [employer] )   Admission Status   Status of Admission 201  [facility])   [facility] 91 N/A   Patient History   Presenting Problems Per crisis: "Crisis met with Patient in [facility]  Patient is a 36 y/o male presenting to the ED following an intentional overdose of heroin  Patient said Almas Encarnacion took his kids because of his heroin abuse and was told he needs to seek treatment in order to get them back  He was clean for 3-6 months prior to relapsing 2-3 weeks ago  He denied any current suicidal/homicidal ideations and denied any visual/auditory hallucinations  Patient said was suicidal last night but not now  He reports increased depression/anxiety and decreased sleep/appetite  He has not had been on medication for 3 months because [facility] was no longer willing to treat him  Patient said he needs dual dx to get clean and to get his children back from [facility]  Patient agreed to sign a 201 after rights and detailed explanation of the 72 hr notice were read to and and reviewed with him "   Treatment History Reports having several AIP in the past, most recently [facility] in 3/2021   Currently in Treatment No  (History of [facility] OP in [facility] and wants to be referred back to this facility )   Medical Problems See medical consult   Legal Issues Patient reports getting a DUI about 1 week ago  Patient also reports that yesterday his two young sons were taken by [facility] and placed with their maternal grandmother     Substance Abuse Yes (See [facility] History section for detail)  (Patient admits to using herion (snorting) and THC (smoking)  )   Crisis Info   Release of Information Signed Yes  (ROIs signed for [facility] ([phone]) and [facility] ([phone]))     CM met and spoke with patient to complete psychosocial assessment  Patient reports he was admitted due to increased depression and suicidal ideation s/p attempt via OD on 20 bags of heroin  Patient reports the reason for this OD was because yesterday his two young sons were taken by [facility] and placed with their maternal grandmother  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  Patient notes his limitation is "Being in my head all the time " Patient notes that his coping skills are talking to his significant other, Virginia Mack, walking, reading and playing with his sons  Patient admits to being noncompliant with his medications in the community  Patient denies SI, HI, AH, VH, and paranoia, however, does admit to some delusions  Patient reports his depression is currently 6/10 and anxiety is 7/10  Patient denies any current or past history of trauma or abuse and feels safe returning home  Patient reports his mother was schizophrenic and his father was bipolar  Patient admits that his dad had both suicidal and homicidal attempts in the past  Patient reports multiple members of his family have a D&A issue  Patient admits to using heroin and THC in his recent past  Patient notes snorts heroin and smokes marijuana but reports he does not use it on a regular basis, only when he feels he is at "rock bottom"  Prior to this use, patient reports he was about 1 year clean  Patient reports his significant other, Virginia Mack, also attempted to OD with him and she had been clean about 2+ years  Patient declined need for inpatient or outpatient D&A treatment at this time  Patient reports he does smoke about 1 ppd of cigarettes and has the nicotine patch   Patient admits to getting a DUI about 1 week ago and notes that he is currently working with [facility] worker, Slade Negrete, regarding his sons  Patient and his girlfriend Ethan Lacey have been together for about 5 years  Together they have two sons: Damaso Ji (3) and Diana Lal (6 months old)  His children have been taken by [facility] to live with their maternal grandmother at this time  Patient reports he gets supervised visits with them until the hearing which is about 1 month from now  Patient reports his parents and siblings are not involved in his life  Patient notes they do have a cat at home that Ethan Lacey is able to watch as she was discharged from the hospital today  Patient lives in an apartment in [place] with his girlfriend, Ethan Lacey, and their two children when they are in their custody  Patient reports he is able to return to his home upon discharge  Patient does not currently drive and relies on Ethan Lacey for transportation  Patient reports he would like his medications filled at [facility] in [place]  Patient declined a PCP appointment but is agreeable to being referred back to [facility] OP services in [place], [place]  CM did call and speak with Ethan Lacey who reported that patient will often deny he is depressed but reports he is severely depressed  CM will reiterate this to the provider  CM provided call back information to Ethan Lacey for any further questions or concerns  CM will continue to follow patient's progress and assist with discharge planning needs

## 2021-06-25 NOTE — TREATMENT PLAN
TREATMENT PLAN REVIEW - [address] [facility] Rohini 35 y o  1990 male MRN: [id number]    [facility] [address] [facility] / [facility] [id number] Encounter: [id number]          Admit Date/Time:  6/23/2021 11:51 PM    Treatment Team: Attending Provider: Marvina Goodpasture, MD; Patient Care Technician: William Lyons; Consulting Physician: Dinorah Penn MD; Registered Nurse: Donny Palma RN; Certified Nursing Assistant: Ash Fisher; Patient Care Assistant: Eri Hastings; Care Manager: Chidi Gaviria RN; Recreational Therapist: Lori Snyder;  : Aurea Mustafa    Diagnosis: Principal Problem:    Schizoaffective disorder, bipolar type ([facility] )  Active Problems:    Tobacco abuse    Drug abuse and dependence ([facility] )    Medical clearance for psychiatric admission      Patient Strengths/Assets: average or above intelligence, good support system, negotiates basic needs    Patient Barriers/Limitations: limited support system, poor insight, substance abuse    Short Term Goals: decrease in depressive symptoms, ability to stay safe on the unit, improvement in insight    Long Term Goals: stabilization of mood, Stable outpatient care and follow-up, remain free of substances    Progress Towards Goals: starting psychiatric medications as prescribed    Recommended Treatment: medication management, patient medication education, group therapy, milieu therapy, continued Behavioral Health psychiatric evaluation/assessment process    Treatment Frequency: daily medication monitoring, group and milieu therapy daily, monitoring through interdisciplinary rounds, monitoring through weekly patient care conferences    Expected Discharge Date:  7/2/2021    Discharge Plan: referral to outpatient drug and alcohol rehabilitation program, return to previous living arrangement    Treatment Plan Created/Updated By: Pauly Luo MD

## 2021-06-25 NOTE — DISCHARGE INSTR - OTHER ORDERS
You are being discharged to your home with your significant other, Geovanna Dominguez, located at [address],  Rima Lezama Said (Ph# [phone])  Triggers you have identified during your hospitalization that led to your admission was increased depression and suicidal ideation  Coping skills you have identified during your hospitalization include talking with your significant other, walking, reading and playing with your children  If you are unable to deal with your distressed mood alone please contact [facility] at [phone]  If that is not effective and you continue to have suicidal ideations and/or are in a distressed mood please contact [facility] ([facility]) AT [place] at [phone], dial [phone] or go to the nearest emergency center  Crisis Information:  [facility] Crisis Hotline: 872.944.9615  *Peer Hotline (M-F 6-10pm): 9-621.297.4642  * Alcohol Anonymous: 905.144.8459  * D&A Commission: (168 20 615 Suicide Prevention Lifeline:  8-875.750.6925  *[facility] on Mental Illness ([place]) HELPLINE: 663.315.8459/Website: www skyla org  *Substance Abuse and [address] [facility] [employer], which is a confidential, free, 24-hour-a-day, 365-day-a-year, information service for individuals and family members facing mental health and/or substance use disorders  This service provides referrals to local treatment facilities, support groups, and community-based organizations  Callers can also order free publications and other information  Call 8-411.876.4483/Website: www [facility] gov  *[employer] 2-1-1: This is a toll free, confidential, 24-hour-a-day service which connects you to a community  in your area who can help you find services and resources that are available to you locally and provide critical services that can improve and save lives    Call: 211  /Website: https://keilaDynexsohail meek/

## 2021-06-25 NOTE — PROGRESS NOTES
06/25/21 0762   Activity/Group Checklist   Group   (Goal Planning and Communication)   Attendance Did not attend  (Pt was asleep)

## 2021-06-25 NOTE — NURSING NOTE
Patient was admitted to the unit from the ER at approximately 1435  Patient was escorted by myself and 1:1 from ER  Patient wouldn't sign admission paperwork because he felt like he shouldn't be here  Patient was extremely irritable punching  the wall because he couldn't leave to go home  Will be maintained on 7 minute safety checks

## 2021-06-25 NOTE — PROGRESS NOTES
06/25/21 1500   Team Meeting   Meeting Type Tx Team Meeting   Initial Conference Date 06/25/21   Next Conference Date 07/25/21   Team Members Present   Team Members Present Physician;Nurse;   Physician Team Member Dr Bowser Setting Team Member Freya Genao RN   Social Work Team Member Fermin Steel   Patient/Family Present   Patient Present Yes   Patient's Family Present No     Treatment Plan reviewed with patient  Patient verbalized understanding and signed the treatment plan  No other questions or concerns noted  Treatment team will continue to follow patient's progress and adjust treatment plan as needed
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
## 2021-06-25 NOTE — PROGRESS NOTES
Per report provided by RN, Cindy Cornelius presented with opioid overdose and has been medically cleared for psychiatric admission   Cindy Cornelius is anxious and might be withdrawing from opioids and no comfort medications are available for opioid withdrawal  Chart review was done and PRN Clonidine, baclofen, zofran, imodium, melatonin and bentyl were ordered

## 2021-06-25 NOTE — NURSING NOTE
Patient has been pleasant and cooperative this morning  Denies any H/S ideation at this time  Patient said he is agreeable to take medication so he can work on feeling better  No delusional behaviors observed  Will be maintained on 7 minute safety checks

## 2021-06-25 NOTE — CONSULTS
[address] 1990, 35 y o  male MRN: [id number]  Unit/Bed#: [facility] [place] 254-02 Encounter: [id number]  Primary Care Provider: No primary care provider on file  Date and time admitted to hospital: 6/23/2021 11:51 PM    Inpatient consult for Medical Clearance for [facility] patient  Consult performed by: Vinayak Moreno PA-C  Consult ordered by: BHAVESH Alcantara          Medical clearance for psychiatric admission  Assessment & Plan  · Patient was medically cleared for inpatient behavior health admission in the emergency room  · Patient remains medically cleared for inpatient behavior health admission  · Patient signed a 201 for treatment    Tobacco abuse  Assessment & Plan  · Nicotine patch patient request patch start tonight, order change    Drug abuse and dependence ([facility] [address] )  Assessment & Plan  · Patient reported heroin and marijuana  · Management per inpatient behavior health    * Major depressive disorder  Assessment & Plan  · Management per inpatient behavior health      Recommendations for Discharge:  · Per Primary Service      History of Present Illness:  Long Leonard is a 35 y o  male who was originally evaluated in the emergency room secondary to report of taking 20 bags of heroin prior to arrival reported depression and called crisis hotline and express suicidal ideation  We are consulted for medical clearance  Patient was medically cleared for inpatient behavior health admission remains medically cleared for inpatient behavior health admission  Patient signed a 12 for treatment  Patient has no room medical problems and will see the patient as needed please call if there is any issues or concerns    Review of Systems:  Review of Systems   Psychiatric/Behavioral: Positive for dysphoric mood and suicidal ideas  All other systems reviewed and are negative        Past Medical and Surgical History:   Past Medical History:   Diagnosis Date    Psychiatric illness     Schizo-affective schizophrenia ([facility] [address] )        History reviewed  No pertinent surgical history  Meds/Allergies:  all medications and allergies reviewed    Allergies: Allergies   Allergen Reactions    Dexamethasone Sodium Phosphate Itching    Mouthwashes Hives     Blisters    Decadron [Dexamethasone] Hives    Tramadol Seizures     Seizure       Social History:     Marital Status: Single    Substance Use History:   Social History     Substance and Sexual Activity   Alcohol Use Not Currently     Social History     Tobacco Use   Smoking Status Current Every Day Smoker    Packs/day: 2 00   Smokeless Tobacco Never Used     Social History     Substance and Sexual Activity   Drug Use Yes    Types: Marijuana, Heroin       Family History:  I have reviewed the patients family history    Physical Exam:   Vitals:   Blood Pressure: 128/70 (06/24/21 1505)  Pulse: 90 (06/24/21 1505)  Temperature: 98 3 °F (36 8 °C) (06/24/21 1505)  Temp Source: Temporal (06/24/21 1505)  Respirations: 18 (06/24/21 1505)  Height: 5' 11" (180 3 cm) (06/24/21 1505)  Weight - Scale: 84 8 kg (187 lb) (06/24/21 1505)  SpO2: 95 % (06/23/21 2353)    Physical Exam  Vitals reviewed  Constitutional:       General: He is not in acute distress  Appearance: Normal appearance  Comments: Sleeping, arouseable   HENT:      Head: Normocephalic and atraumatic  Right Ear: External ear normal       Left Ear: External ear normal       Nose: Nose normal    Eyes:      General:         Right eye: No discharge  Left eye: No discharge  Conjunctiva/sclera: Conjunctivae normal    Cardiovascular:      Rate and Rhythm: Normal rate and regular rhythm  Heart sounds: Normal heart sounds  No murmur heard  Pulmonary:      Effort: Pulmonary effort is normal  No respiratory distress  Breath sounds: Normal breath sounds  No wheezing or rales  Abdominal:      General: Bowel sounds are normal  There is no distension  Palpations: Abdomen is soft  Tenderness: There is no abdominal tenderness  There is no guarding  Musculoskeletal:         General: Normal range of motion  Cervical back: Normal range of motion  Skin:     General: Skin is warm and dry  Neurological:      Mental Status: He is oriented to person, place, and time  Mental status is at baseline  Psychiatric:         Attention and Perception: He is inattentive  Mood and Affect: Mood is depressed  Affect is flat  Speech: Speech is delayed  Behavior: Behavior is withdrawn  Additional Data:   Lab Results: Urine drug screen positive THC              Invalid input(s): LABALBU          Lab Results   Component Value Date/Time    BA1C 5 4 04/23/2020 06:15 AM     ·     ** Please Note: This note has been constructed using a voice recognition system   **

## 2021-06-25 NOTE — PROGRESS NOTES
06/25/21 0800   Team Meeting   Meeting Type Daily Rounds   Initial Conference Date 06/25/21   Team Members Present   Team Members Present Physician;Nurse;   Physician Team Member Dr Richelle Nathan   Nursing Team Member Toribio Schaefer, RN   Social Work Team Member Fermin Chicas   Patient/Family Present   Patient Present No   Patient's Family Present No     New admit  201  Readmit score 19 (yellow)  UDS positive for THC  Significant other is currently in ICU for overdose  Patient reports he overdosed on 20 bags of heroin but no opioids found in system  Lab doing further testing  Very upset upon admission  CYS took his two sons  Was upset that he needs to go to a dual diagnosis to get his sons back  Very threatening and punching walls  Presented psychotic at times  More pleasant this morning

## 2021-06-25 NOTE — PROGRESS NOTES
06/25/21 1315   Activity/Group Checklist   Group   ([employer])   Attendance Did not attend  (PT opted to not join group)

## 2021-06-25 NOTE — DISCHARGE INSTR - APPOINTMENTS
A referral has been made on your behalf for medication management with [facility], [address] [facility] [address] [place] [address]  Phone: [phone]  The hospital  will call you at home with your appointment date/time  Please plan to arrive 15 minutes prior to your scheduled appointment, bring your insurance card(s) and photo ID  Your discharge will be faxed to this provider for continuity of care  You identified Dr Margarita Ye as your primary care physician but declined a follow up appointment at this time  Your discharge will be faxed to this provider for continuity of care  The treatment team recommends substance abuse  due to being admitted positive for opiates and marijuana; you declined a referral at this time  If you wish to seek  in the future, please call [facility] at [phone] directly to schedule an intake appointment  Ernst Mitchell RN, our [facility] [employer], will be calling you after your discharge, on the phone number that you provided  She will be available as an additional support, if needed  If you wish to speak with her, you may contact Melodee Lefort at [phone]

## 2021-06-25 NOTE — NURSING NOTE
Pt received at 1900 in stable condition  Pt was observed resting in bed during entire shift  This nurse and hospitalist went into pts room for admission h&p assessment, and he responded appropriately with all questions asked but speech was mumbled  Pt denies SI/HI or hallucinations but stated "how long do I have to stay here"  Pt requested nicotine patch and hospitalist ordered patch to be put on QHS  Patch placed on pt left upper arm  Will continue to monitor

## 2021-06-25 NOTE — PROGRESS NOTES
06/25/21 1015   Activity/Group Checklist   Group   ([employer])   Attendance Did not attend  (AT Group offered, PT was sleeping)

## 2021-06-25 NOTE — PROGRESS NOTES
PT came to [facility] on this date with the following belongings:      Sent to Storage:  Shirt x 1  Cellphone x 2  Earbud x 1 (1 missing)  External Battery x 1  Cargo Shorts x 1  Belt x 1  Shoes x 1 pair  Cigarette   Lighter x 1   x 1   cord x 2

## 2021-06-26 PROCEDURE — 99232 SBSQ HOSP IP/OBS MODERATE 35: CPT | Performed by: NURSE PRACTITIONER

## 2021-06-26 RX ORDER — GABAPENTIN 300 MG/1
300 CAPSULE ORAL 2 TIMES DAILY
Status: DISCONTINUED | OUTPATIENT
Start: 2021-06-26 | End: 2021-06-29 | Stop reason: HOSPADM

## 2021-06-26 RX ORDER — CLONIDINE HYDROCHLORIDE 0.1 MG/1
0.1 TABLET ORAL 2 TIMES DAILY
Status: DISCONTINUED | OUTPATIENT
Start: 2021-06-26 | End: 2021-06-27

## 2021-06-26 RX ORDER — PALIPERIDONE 3 MG/1
3 TABLET, EXTENDED RELEASE ORAL DAILY
Status: DISCONTINUED | OUTPATIENT
Start: 2021-06-26 | End: 2021-06-29 | Stop reason: HOSPADM

## 2021-06-26 RX ORDER — ESCITALOPRAM OXALATE 5 MG/1
5 TABLET ORAL DAILY
Status: DISCONTINUED | OUTPATIENT
Start: 2021-06-26 | End: 2021-06-29 | Stop reason: HOSPADM

## 2021-06-26 RX ADMIN — GABAPENTIN 300 MG: 300 CAPSULE ORAL at 21:44

## 2021-06-26 RX ADMIN — CLONIDINE HYDROCHLORIDE 0.1 MG: 0.1 TABLET ORAL at 21:44

## 2021-06-26 RX ADMIN — BENZTROPINE MESYLATE 1 MG: 1 TABLET ORAL at 08:30

## 2021-06-26 RX ADMIN — NICOTINE 1 PATCH: 21 PATCH, EXTENDED RELEASE TRANSDERMAL at 08:32

## 2021-06-26 RX ADMIN — PALIPERIDONE 3 MG: 3 TABLET, EXTENDED RELEASE ORAL at 12:02

## 2021-06-26 RX ADMIN — OLANZAPINE 5 MG: 5 TABLET, FILM COATED ORAL at 08:30

## 2021-06-26 RX ADMIN — GABAPENTIN 300 MG: 300 CAPSULE ORAL at 12:02

## 2021-06-26 RX ADMIN — CLONIDINE HYDROCHLORIDE 0.1 MG: 0.1 TABLET ORAL at 10:09

## 2021-06-26 RX ADMIN — ACETAMINOPHEN 975 MG: 325 TABLET ORAL at 15:20

## 2021-06-26 RX ADMIN — TRAZODONE HYDROCHLORIDE 50 MG: 50 TABLET ORAL at 21:44

## 2021-06-26 RX ADMIN — ESCITALOPRAM 5 MG: 5 TABLET, FILM COATED ORAL at 12:02

## 2021-06-26 RX ADMIN — HYDROXYZINE HYDROCHLORIDE 50 MG: 25 TABLET, FILM COATED ORAL at 08:29

## 2021-06-26 NOTE — NURSING NOTE
Pt observed resting without interruption throughout shift  No s/s of distress noted  Q7 minute checks in place will continue to monitor

## 2021-06-26 NOTE — PROGRESS NOTES
Progress Note - Behavioral Health   Merlinda Mote 35 y o  male MRN: [id number]  Unit/Bed#: [facility]-02 Encounter: [id number]    Assessment/Plan   Principal Problem:    Schizoaffective disorder, bipolar type ([facility] )  Active Problems:    Tobacco abuse    Drug abuse and dependence ([facility] )    Medical clearance for psychiatric admission  Patient was seen for continuing care and treatment  Case was discussed with the nursing staff  On examination today, the patient is quite uncomfortable  He is experiencing some withdrawal   Has history of heroin abuse  Tells me did see the doctor yesterday but no meds were started  I believe this was just an air  So we did start meds today of Lexapro 5 mg daily and Invega 3 mg daily  If he tolerates well, secondary to his nausea and overall feeling of malaise, then we will increase the doses up to 10 mg of Lexapro and 6 mg of Invega  He agrees to that  In the meantime, we will start some clonidine 0 1 mg b i d  To help with withdrawal symptoms and also gabapentin 300 mg b i d   Patient continues to require inpatient care and treatment secondary to continued persistent depression and anxiety      Behavior over the last 24 hours:  unchanged  Sleep: normal  Appetite: poor  Medication side effects: No  ROS: no complaints    Mental Status Evaluation:  Appearance:  casually dressed and disheveled   Behavior:  psychomotor retardation   Speech:  normal pitch and normal volume   Mood:  Feeling depressed and sick   Affect:  mood-congruent   Thought Process:  circumstantial   Thought Content:  obsessions   Perceptual Disturbances: None   Risk Potential: Suicidal Ideations none  Homicidal Ideations none  Potential for Aggression No   Sensorium:  person, place and time/date   Memory:  recent and remote memory grossly intact   Consciousness:  alert and awake    Attention: attention span appeared shorter than expected for age   Insight:  fair   Judgment: fair   Gait/Station: normal gait/station Motor Activity: no abnormal movements     Progress Toward Goals:  No change noted    Recommended Treatment: Continue with group therapy, milieu therapy and occupational therapy  Risks, benefits and possible side effects of Medications:   Risks, benefits, and possible side effects of medications explained to patient and patient verbalizes understanding  Medications:  Clonidine 0 1 mg b i d  This will be held for blood pressure less than 90/60  Start Lexapro 5 mg daily  If tolerates today, increase to 10 mg   Gabapentin 300 mg b i d  Start Invega 3 mg daily, if tolerated increase to 6 mg daily  Labs: I have personally reviewed all pertinent laboratory/tests results  Most Recent Labs:   Lab Results   Component Value Date    WBC 9 00 06/25/2021    RBC 4 60 06/25/2021    HGB 13 2 (L) 06/25/2021    HCT 40 0 (L) 06/25/2021     (H) 06/25/2021    RDW 14 8 (H) 06/25/2021    NEUTROABS 5 10 06/25/2021    SODIUM 139 06/25/2021    K 3 9 06/25/2021     06/25/2021    CO2 28 06/25/2021    BUN 10 06/25/2021    CREATININE 0 80 06/25/2021    GLUC 90 06/25/2021    GLUF 90 06/25/2021    CALCIUM 8 8 06/25/2021    AST 17 06/25/2021    ALT 19 06/25/2021    ALKPHOS 61 06/25/2021    TP 6 8 06/25/2021    ALB 3 9 06/25/2021    TBILI 0 30 06/25/2021    VLF3NKLKLRHR 1 860 06/25/2021    HGBA1C 5 4 04/23/2020     04/23/2020       Counseling / Coordination of Care  Total floor / unit time spent today 25 minutes  Greater than 50% of total time was spent with the patient and / or family counseling and / or coordination of care   A description of the counseling / coordination of care:  Chart review, medication management treatment

## 2021-06-26 NOTE — PLAN OF CARE
Problem: Alteration in Thoughts and Perception  Goal: Treatment Goal: Gain control of psychotic behaviors/thinking, reduce/eliminate presenting symptoms and demonstrate improved reality functioning upon discharge  Outcome: Progressing  Goal: Refrain from acting on delusional thinking/internal stimuli  Description: Interventions:  - Monitor patient closely, per order   - Utilize least restrictive measures   - Set reasonable limits, give positive feedback for acceptable   - Administer medications as ordered and monitor of potential side effects  Outcome: Progressing  Goal: Agree to be compliant with medication regime, as prescribed and report medication side effects  Description: Interventions:  - Offer appropriate PRN medication and supervise ingestion; conduct AIMS, as needed   Outcome: Progressing  Goal: Recognize dysfunctional thoughts, communicate reality-based thoughts at the time of discharge  Description: Interventions:  - Provide medication and psycho-education to assist patient in compliance and developing insight into his/her illness   Outcome: Progressing     Problem: Risk for Self Injury/Neglect  Goal: Treatment Goal: Remain safe during length of stay, learn and adopt new coping skills, and be free of self-injurious ideation, impulses and acts at the time of discharge  Outcome: Progressing  Goal: Refrain from harming self  Description: Interventions:  - Monitor patient closely, per order  - Develop a trusting relationship  - Supervise medication ingestion, monitor effects and side effects   Outcome: Progressing  Goal: Recognize maladaptive responses and adopt new coping mechanisms  Outcome: Progressing     Problem: Depression  Goal: Treatment Goal: Demonstrate behavioral control of depressive symptoms, verbalize feelings of improved mood/affect, and adopt new coping skills prior to discharge  Outcome: Progressing  Goal: Verbalize thoughts and feelings  Description: Interventions:  - Assess and re-assess patient's level of risk   - Engage patient in 1:1 interactions, daily, for a minimum of 15 minutes   - Encourage patient to express feelings, fears, frustrations, hopes   Outcome: Progressing  Goal: Refrain from harming self  Description: Interventions:  - Monitor patient closely, per order   - Supervise medication ingestion, monitor effects and side effects   Outcome: Progressing  Goal: Refrain from isolation  Description: Interventions:  - Develop a trusting relationship   - Encourage socialization   Outcome: Progressing  Goal: Refrain from self-neglect  Outcome: Progressing  Goal: Complete daily ADLs, including personal hygiene independently, as able  Description: Interventions:  - Observe, teach, and assist patient with ADLS  -  Monitor and promote a balance of rest/activity, with adequate nutrition and elimination   Outcome: Progressing     Problem: Risk for Violence/Aggression Toward Others  Goal: Treatment Goal: Refrain from acts of violence/aggression during length of stay, and demonstrate improved impulse control at the time of discharge  Outcome: Progressing  Goal: Refrain from harming others  Outcome: Progressing  Goal: Refrain from destructive acts on the environment or property  Outcome: Progressing  Goal: Control angry outbursts  Description: Interventions:  - Monitor patient closely, per order  - Ensure early verbal de-escalation  - Monitor prn medication needs  - Set reasonable/therapeutic limits, outline behavioral expectations, and consequences   - Provide a non-threatening milieu, utilizing the least restrictive interventions   Outcome: Progressing  Goal: Identify appropriate positive anger management techniques  Description: Interventions:  - Offer anger management and coping skills groups   - Staff will provide positive feedback for appropriate anger control  Outcome: Progressing

## 2021-06-26 NOTE — NURSING NOTE
Pt is withdrawn to room the entire shift, refused snack but offered fluids for hydration  Not on any nighttime medication and did not want any PRN medication  Reported "Im fine right now just want to go home"  Denies SI/Hi or hallucinations  Tearful/depressed affect  Observed sleeping during rounds with no s/s of distress  Will continue to monitor

## 2021-06-26 NOTE — NURSING NOTE
Patient awake in room  Eats meals in dayroom  Medication compliant  Appetite good  Note with increase anxiety and increase agitation  PRN haldol, atarax and  Cogentin given @ 0830  PRN Clonidine given to help withdrawal symptoms @ 1009 with effects  Denies Hallucinations, SI, HI  Provide First dose medication education   Continue on safety checks

## 2021-06-27 PROCEDURE — 99232 SBSQ HOSP IP/OBS MODERATE 35: CPT | Performed by: PSYCHIATRY & NEUROLOGY

## 2021-06-27 RX ORDER — CLONIDINE HYDROCHLORIDE 0.1 MG/1
0.1 TABLET ORAL 3 TIMES DAILY
Status: DISCONTINUED | OUTPATIENT
Start: 2021-06-27 | End: 2021-06-29 | Stop reason: HOSPADM

## 2021-06-27 RX ADMIN — GABAPENTIN 300 MG: 300 CAPSULE ORAL at 17:37

## 2021-06-27 RX ADMIN — GABAPENTIN 300 MG: 300 CAPSULE ORAL at 08:37

## 2021-06-27 RX ADMIN — NICOTINE 1 PATCH: 21 PATCH, EXTENDED RELEASE TRANSDERMAL at 08:37

## 2021-06-27 RX ADMIN — BACLOFEN 10 MG: 10 TABLET ORAL at 16:17

## 2021-06-27 RX ADMIN — TRAZODONE HYDROCHLORIDE 50 MG: 50 TABLET ORAL at 21:29

## 2021-06-27 RX ADMIN — CLONIDINE HYDROCHLORIDE 0.1 MG: 0.1 TABLET ORAL at 08:37

## 2021-06-27 RX ADMIN — ONDANSETRON 4 MG: 4 TABLET, ORALLY DISINTEGRATING ORAL at 16:22

## 2021-06-27 RX ADMIN — PALIPERIDONE 3 MG: 3 TABLET, EXTENDED RELEASE ORAL at 08:37

## 2021-06-27 RX ADMIN — CLONIDINE HYDROCHLORIDE 0.1 MG: 0.1 TABLET ORAL at 16:18

## 2021-06-27 RX ADMIN — ESCITALOPRAM 5 MG: 5 TABLET, FILM COATED ORAL at 08:37

## 2021-06-27 RX ADMIN — HYDROXYZINE HYDROCHLORIDE 50 MG: 25 TABLET, FILM COATED ORAL at 16:17

## 2021-06-27 RX ADMIN — OLANZAPINE 5 MG: 5 TABLET, FILM COATED ORAL at 16:17

## 2021-06-27 NOTE — NURSING NOTE
PRN trazodone effective  Patient observed asleep or resting for the rest of the night without issue/concern  Non-labored breathing noted  No signs or symptoms of distress noted  Will remain on safety precautions and continual monitoring

## 2021-06-27 NOTE — PROGRESS NOTES
C/O" I have these withdrawal problems, aches and pain, muscle spasms, running nose,    Report from staff regarding this patient received and record reviewed  prior to seeing this patient   Behavior over the last 24 hours:  Seen on the unit for schizoaffective, poly substance absue, with c/o of moderate to severe withdrawal sxs from opiates and other substance he is on clonidine 0 1 bid   Sleep:4 hours  Appetite:ok  Medication side effects:none  ROS:withdrawal sxs   Mental Status Evaluation:  Appearance:  Dressed appropriately, white man, who looks younger then age 35   Behavior:  cooperative   Speech:  normal   Mood:  euthymic   Affect:    blunted   Thought Process:  disorganzied   Thought Content:  normal   Perceptual Disturbances: Denied AV hallucination   Risk Potential: NO MONALISA    Sensorium:  normal   Cognition:  intact   Consciousness:  Alert, OX3   Attention: Fair   Insight:  limited   Judgment: limited   Gait/Station: With in normal range   Motor Activity: With in normal range     Progress Toward Goals: working on current treatment goals, no changes  Made in treatment plan   Recommended Treatment: Continue with group therapy, milieu therapy and occupational therapy  Risks, benefits and possible side effects of Medications:   Risks, benefits, and possible side effects of medications explained to patient and patient verbalizes understanding        Medications:   current meds:   Current Facility-Administered Medications   Medication Dose Route Frequency    acetaminophen (TYLENOL) tablet 650 mg  650 mg Oral Q6H PRN    acetaminophen (TYLENOL) tablet 650 mg  650 mg Oral Q4H PRN    acetaminophen (TYLENOL) tablet 975 mg  975 mg Oral Q6H PRN    aluminum-magnesium hydroxide-simethicone (MYLANTA) oral suspension 30 mL  30 mL Oral Q4H PRN    artificial tear (LUBRIFRESH P M ) ophthalmic ointment 1 application  1 application Both Eyes C9L PRN    baclofen tablet 10 mg  10 mg Oral TID PRN    benztropine (COGENTIN) injection 1 mg  1 mg Intramuscular Q4H PRN Max 6/day    benztropine (COGENTIN) tablet 1 mg  1 mg Oral Q4H PRN Max 6/day    cloNIDine (CATAPRES) tablet 0 1 mg  0 1 mg Oral BID PRN    cloNIDine (CATAPRES) tablet 0 1 mg  0 1 mg Oral TID    dicyclomine (BENTYL) tablet 20 mg  20 mg Oral TID PRN    hydrOXYzine HCL (ATARAX) tablet 50 mg  50 mg Oral Q6H PRN Max 4/day    Or    diphenhydrAMINE (BENADRYL) injection 50 mg  50 mg Intramuscular Q6H PRN    escitalopram (LEXAPRO) tablet 5 mg  5 mg Oral Daily    gabapentin (NEURONTIN) capsule 300 mg  300 mg Oral BID    hydrOXYzine HCL (ATARAX) tablet 100 mg  100 mg Oral Q6H PRN Max 4/day    hydrOXYzine HCL (ATARAX) tablet 25 mg  25 mg Oral Q6H PRN Max 4/day    loperamide (IMODIUM) oral liquid 2 mg  2 mg Oral TID PRN    melatonin tablet 3 mg  3 mg Oral HS PRN    nicotine (NICODERM CQ) 21 mg/24 hr TD 24 hr patch 1 patch  1 patch Transdermal Daily    OLANZapine (ZyPREXA) tablet 10 mg  10 mg Oral Q3H PRN Max 3/day    Or    OLANZapine (ZyPREXA) IM injection 10 mg  10 mg Intramuscular Q3H PRN Max 3/day    OLANZapine (ZyPREXA) tablet 5 mg  5 mg Oral Q3H PRN Max 6/day    Or    OLANZapine (ZyPREXA) IM injection 5 mg  5 mg Intramuscular Q3H PRN Max 6/day    OLANZapine (ZyPREXA) tablet 2 5 mg  2 5 mg Oral Q3H PRN Max 8/day    ondansetron (ZOFRAN-ODT) dispersible tablet 4 mg  4 mg Oral Q6H PRN    paliperidone (INVEGA) 24 hr tablet 3 mg  3 mg Oral Daily    senna-docusate sodium (SENOKOT S) 8 6-50 mg per tablet 1 tablet  1 tablet Oral Daily PRN    traZODone (DESYREL) tablet 50 mg  50 mg Oral HS PRN     Labs: NA    Assessment, Diagnosis  and Plan:  Change the clonidine 0 1 mg tid  For withdrwal sxs, continue with current meds and goals, F/U tomorrow    Counseling / Coordination of Care  Total floor / unit time spent today20 minutes  minutes  Greater than 50% of total time was spent with the patient and / or family counseling and / or coordination of care   A description of the counseling / coordination of care: , change the clonidine 0 1 mg tid and follow up with treatment team on Monday     Tyson Orantes MD

## 2021-06-27 NOTE — PLAN OF CARE
Problem: Alteration in Thoughts and Perception  Goal: Treatment Goal: Gain control of psychotic behaviors/thinking, reduce/eliminate presenting symptoms and demonstrate improved reality functioning upon discharge  Outcome: Progressing  Goal: Refrain from acting on delusional thinking/internal stimuli  Description: Interventions:  - Monitor patient closely, per order   - Utilize least restrictive measures   - Set reasonable limits, give positive feedback for acceptable   - Administer medications as ordered and monitor of potential side effects  Outcome: Progressing  Goal: Agree to be compliant with medication regime, as prescribed and report medication side effects  Description: Interventions:  - Offer appropriate PRN medication and supervise ingestion; conduct AIMS, as needed   Outcome: Progressing  Goal: Recognize dysfunctional thoughts, communicate reality-based thoughts at the time of discharge  Description: Interventions:  - Provide medication and psycho-education to assist patient in compliance and developing insight into his/her illness   Outcome: Progressing     Problem: Ineffective Coping  Goal: Cooperates with admission process  Description: Interventions:   - Complete admission process  Outcome: Progressing  Goal: Identifies ineffective coping skills  Outcome: Progressing  Goal: Identifies healthy coping skills  Outcome: Progressing  Goal: Demonstrates healthy coping skills  Outcome: Progressing  Goal: Participates in unit activities  Description: Interventions:  - Provide therapeutic environment   - Provide required programming   - Redirect inappropriate behaviors   Outcome: Progressing  Goal: Patient/Family participate in treatment and DC plans  Description: Interventions:  - Provide therapeutic environment  Outcome: Progressing  Goal: Patient/Family verbalizes awareness of resources  Outcome: Progressing  Goal: Understands least restrictive measures  Description: Interventions:  - Utilize least restrictive behavior  Outcome: Progressing  Goal: Free from restraint events  Description: - Utilize least restrictive measures   - Provide behavioral interventions   - Redirect inappropriate behaviors   Outcome: Progressing     Problem: Risk for Self Injury/Neglect  Goal: Treatment Goal: Remain safe during length of stay, learn and adopt new coping skills, and be free of self-injurious ideation, impulses and acts at the time of discharge  Outcome: Progressing  Goal: Refrain from harming self  Description: Interventions:  - Monitor patient closely, per order  - Develop a trusting relationship  - Supervise medication ingestion, monitor effects and side effects   Outcome: Progressing  Goal: Recognize maladaptive responses and adopt new coping mechanisms  Outcome: Progressing     Problem: Depression  Goal: Treatment Goal: Demonstrate behavioral control of depressive symptoms, verbalize feelings of improved mood/affect, and adopt new coping skills prior to discharge  Outcome: Progressing  Goal: Verbalize thoughts and feelings  Description: Interventions:  - Assess and re-assess patient's level of risk   - Engage patient in 1:1 interactions, daily, for a minimum of 15 minutes   - Encourage patient to express feelings, fears, frustrations, hopes   Outcome: Progressing  Goal: Refrain from harming self  Description: Interventions:  - Monitor patient closely, per order   - Supervise medication ingestion, monitor effects and side effects   Outcome: Progressing  Goal: Refrain from isolation  Description: Interventions:  - Develop a trusting relationship   - Encourage socialization   Outcome: Progressing  Goal: Refrain from self-neglect  Outcome: Progressing  Goal: Complete daily ADLs, including personal hygiene independently, as able  Description: Interventions:  - Observe, teach, and assist patient with ADLS  -  Monitor and promote a balance of rest/activity, with adequate nutrition and elimination   Outcome: Progressing Problem: Anxiety  Goal: Anxiety is at manageable level  Description: Interventions:  - Assess and monitor patient's anxiety level  - Monitor for signs and symptoms (heart palpitations, chest pain, shortness of breath, headaches, nausea, feeling jumpy, restlessness, irritable, apprehensive)  - Collaborate with interdisciplinary team and initiate plan and interventions as ordered    - Luna patient to unit/surroundings  - Explain treatment plan  - Encourage participation in care  - Encourage verbalization of concerns/fears  - Identify coping mechanisms  - Assist in developing anxiety-reducing skills  - Administer/offer alternative therapies  - Limit or eliminate stimulants  Outcome: Progressing     Problem: Risk for Violence/Aggression Toward Others  Goal: Treatment Goal: Refrain from acts of violence/aggression during length of stay, and demonstrate improved impulse control at the time of discharge  Outcome: Progressing  Goal: Refrain from harming others  Outcome: Progressing  Goal: Refrain from destructive acts on the environment or property  Outcome: Progressing  Goal: Control angry outbursts  Description: Interventions:  - Monitor patient closely, per order  - Ensure early verbal de-escalation  - Monitor prn medication needs  - Set reasonable/therapeutic limits, outline behavioral expectations, and consequences   - Provide a non-threatening milieu, utilizing the least restrictive interventions   Outcome: Progressing  Goal: Identify appropriate positive anger management techniques  Description: Interventions:  - Offer anger management and coping skills groups   - Staff will provide positive feedback for appropriate anger control  Outcome: Progressing

## 2021-06-27 NOTE — PROGRESS NOTES
Pt suddenly became extremely agitated observed yelling at peer through bedroom door  Pt was redirectable by staff and retreated to room  Pt expressed that he did not like the way peers were speaking to females on unit and he was raised to respect woman  Pt allowed to vent to RN  Pt showed improved judgment and ability to keep control of acute behaviors  Complaints of withdrawal symptoms including generalized pain, spasms, anxiety, agitation, and nausea  Administered prn medications as prescribed  Medications effective in controlling symptom complaints

## 2021-06-27 NOTE — NURSING NOTE
Patient observed walking on the unit  He appears flat and depressed  Irritable  Pt states he is "ready to get out of here and get a job"  Rates his anxiety and depression as "high"  C/o withdrawal symptoms such as aches and pains, running nose- Clonidine 0 1 mg increased to TID  His appetite is good- 100% of meals  Complaint with medications  He offers no complaints/concerns  Will CTM  Q7 minute safety checks in progress

## 2021-06-27 NOTE — NURSING NOTE
Patient withdrawn to his room this evening  Affect flat and depressed  During assessment patient shares remorse over relapsing on heroin, stating he will do "whatever it takes" to get clean for his sons  He identified stressors such as being evicted and getting a recent DUI also adding to his depression and anxiety which he describes as "high"  Patient given emotional support and was thankful  Patient admits to still feeling withdrawal symptoms but expresses he is improving  Patient given Neurontin and clonidine as ordered  Denies SI/HI and hallucinations  Denies further needs at this time  Will remain on safety precautions and continual monitoring

## 2021-06-28 PROCEDURE — 99232 SBSQ HOSP IP/OBS MODERATE 35: CPT | Performed by: NURSE PRACTITIONER

## 2021-06-28 RX ADMIN — NICOTINE 1 PATCH: 21 PATCH, EXTENDED RELEASE TRANSDERMAL at 08:26

## 2021-06-28 RX ADMIN — ESCITALOPRAM 5 MG: 5 TABLET, FILM COATED ORAL at 08:24

## 2021-06-28 RX ADMIN — CLONIDINE HYDROCHLORIDE 0.1 MG: 0.1 TABLET ORAL at 20:45

## 2021-06-28 RX ADMIN — GABAPENTIN 300 MG: 300 CAPSULE ORAL at 08:24

## 2021-06-28 RX ADMIN — PALIPERIDONE 3 MG: 3 TABLET, EXTENDED RELEASE ORAL at 08:24

## 2021-06-28 RX ADMIN — GABAPENTIN 300 MG: 300 CAPSULE ORAL at 17:11

## 2021-06-28 RX ADMIN — CLONIDINE HYDROCHLORIDE 0.1 MG: 0.1 TABLET ORAL at 15:39

## 2021-06-28 RX ADMIN — TRAZODONE HYDROCHLORIDE 50 MG: 50 TABLET ORAL at 20:45

## 2021-06-28 RX ADMIN — CLONIDINE HYDROCHLORIDE 0.1 MG: 0.1 TABLET ORAL at 08:24

## 2021-06-28 NOTE — NURSING NOTE
Patient withdrawn to himself and room for most of the evening this shift  Presents as flat and depressed  Patient continues to express anxiety and depression stating depression is at a 4/10 and anxiety at a 5/10  Patient admits to feelings of paranoia at times, stating "sometimes I feel like people are out to get me even when they're not"  Patient is adamant about being discharged as soon as possible for a court date that he has  He continues to express remorse towards his recent relapse and hopes to follow up with the [facility]" program  HS clonidine held due to low BP  Patient requested trazodone to help with sleep  Denies SI/HI and hallucinations  No other needs identified currently  Will remain on safety precautions and continual monitoring

## 2021-06-28 NOTE — PROGRESS NOTES
Trazodone effective  Patient observed asleep or resting for majority of the night without issue/concern  Non-labored breathing noted  No signs or symptoms of distress noted  Will remain on safety precautions and continual monitoring

## 2021-06-28 NOTE — PROGRESS NOTES
06/28/21 1000   Activity/Group Checklist   Group   (Self discovery reflection)   Attendance Did not attend  (AT group offered; pt elected to remain in room)

## 2021-06-28 NOTE — PROGRESS NOTES
06/28/21 0816   Team Meeting   Meeting Type Daily Rounds   Initial Conference Date 06/28/21   Team Members Present   Team Members Present Physician;Nurse;   Physician Team Member Dr Luis Villeda; Lesle Dancer[address]   Nursing Team Member Noe Pang, ELISSA   Social Work Team Member Prema Mcpherson   Patient/Family Present   Patient Present No   Patient's Family Present No     Wants Mar[facility] Bowers or possible DC to [facility] to be explored

## 2021-06-28 NOTE — PROGRESS NOTES
Patient has been visible on the unit  Rated anxiety and depression 5/10  Denied SI,HI, or hallucinations  Patient signed 72 hour notice and placed in chart   Signed at 10:35 am   Continue to monitor

## 2021-06-28 NOTE — NURSING NOTE
Pt received on the unit awake and alert in the day room , he denies any depression, anxiety or SI, able to make needs known, pleasant when approached but overall flat,  cooperative, compliant with medications and positive for pm snack  Q7 minute checks continued , will continue to monitor

## 2021-06-28 NOTE — PROGRESS NOTES
06/28/21 1345   Activity/Group Checklist   Group   (Creative Expression)   Attendance Attended   Attendance Duration (min) 31-45   Interactions Interacted appropriately   Affect/Mood Appropriate   Goals Achieved Identified feelings; Able to listen to others; Able to engage in interactions

## 2021-06-28 NOTE — PROGRESS NOTES
Progress Note - 100[facility] 35 y o  male MRN: [id number]   Unit/Bed#: Sh[facility] 254-02 Encounter: [id number]    Behavior over the last 24 hours:      Joann Nickerson was seen for an inpatient follow-up psychiatric visit this date  He denies any suicidal or homicidal ideations as well as any symptoms of psychosis  He is still experiencing slight withdrawal symptoms but is feeling much better overall  He wishes to be discharged as soon as possible and signed a 72 hour notice  He has not had any acute behavior on the unit  He wishes to be discharged as soon as possible because he states he needs to reenact his insurance and take care of things at home  He is not interested in inpatient drug and alcohol rehabilitation at this time  ROS: no complaints, all other systems are negative    Mental Status Evaluation:    Appearance:  casually dressed, dressed appropriately   Behavior:  cooperative, calm   Speech:  normal rate and volume   Mood:  normal   Affect:  normal range and intensity   Thought Process:  organized, logical, coherent   Associations: intact associations   Thought Content:  normal   Perceptual Disturbances: none   Risk Potential: Suicidal ideation - None  Homicidal ideation - None  Potential for aggression - No   Sensorium:  oriented to person, place and time/date   Memory:  recent and remote memory grossly intact   Consciousness:  alert and awake   Attention: attention span and concentration are age appropriate   Insight:  fair   Judgment: fair   Gait/Station: normal gait/station, normal balance   Motor Activity: no abnormal movements     Vital signs in last 24 hours:    Temp:  [97 9 °F (36 6 °C)-98 6 °F (37 °C)] 97 9 °F (36 6 °C)  HR:  [] 99  Resp:  [18] 18  BP: ()/(52-75) 140/75    Laboratory results:  I have personally reviewed all pertinent laboratory/tests results      Progress Toward Goals: improved    Assessment/Plan   Principal Problem:    Schizoaffective disorder, bipolar type [facility])  Active Problems:    Tobacco abuse    Drug abuse and dependence ([facility] [address] )    Medical clearance for psychiatric admission    Recommended Treatment:     Continue current medications as prescribed  Continue to monitor  Plan is for discharge tomorrow pending any change in patient's status      All current active medications have been reviewed  Encourage group therapy, milieu therapy and occupational therapy  Behavioral Health checks every 7 minutes    Current Facility-Administered Medications   Medication Dose Route Frequency Provider Last Rate    acetaminophen  650 mg Oral Q6H PRN Laurahillary Ballics, CRNP      acetaminophen  650 mg Oral Q4H PRN Krysten Ossamuel Ballics, CRNP      acetaminophen  975 mg Oral Q6H PRN Laurahillary Velazco, CRNP      aluminum-magnesium hydroxide-simethicone  30 mL Oral Q4H PRN Lisette Osler Lodics, CRNP      artificial tear  1 application Both Eyes K0F PRN Lisette Osler Lodics, CRNP      baclofen  10 mg Oral TID PRN Dawn Paulino MD      benztropine  1 mg Intramuscular Q4H PRN Max 6/day Laura Velazco, CRNP      benztropine  1 mg Oral Q4H PRN Max 6/day Laura Velazco, CRNP      cloNIDine  0 1 mg Oral BID PRN Dawn Paulino, MD      cloNIDine  0 1 mg Oral TID Bianca Shone, MD      dicyclomine  20 mg Oral TID PRN Dawn Paulino MD      hydrOXYzine HCL  50 mg Oral Q6H PRN Max 4/day Laura Velazco, CRNP      Or    diphenhydrAMINE  50 mg Intramuscular Q6H PRN Lisette Osler Lodics, CRNP      escitalopram  5 mg Oral Daily Stacy Padilla, CRNP      gabapentin  300 mg Oral BID Stacy Padilla, SULEIMANNP      hydrOXYzine HCL  100 mg Oral Q6H PRN Max 4/day Laura Velazco, CRNP      hydrOXYzine HCL  25 mg Oral Q6H PRN Max 4/day Laura Velazco, BHAVESH      loperamide  2 mg Oral TID PRN Dawn Paulino MD      melatonin  3 mg Oral HS PRN Dawn Paulino, MD      nicotine  1 patch Transdermal Daily Laura Donnelly PA-C      OLANZapine  10 mg Oral Q3H PRN Max 3/day BHAVESH Diego      Or    OLANZapine  10 mg Intramuscular Q3H PRN Max 3/day BHAVESH Diego      OLANZapine  5 mg Oral Q3H PRN Max 6/day BHAVESH Diego      Or    OLANZapine  5 mg Intramuscular Q3H PRN Max 6/day Laura Velazco, BHAVESH      OLANZapine  2 5 mg Oral Q3H PRN Max 8/day BHAVESH Diego      ondansetron  4 mg Oral Q6H PRN Graciela Gottlieb MD      paliperidone  3 mg Oral Daily BHAVESH Mccullough      senna-docusate sodium  1 tablet Oral Daily PRN BHAVESH Diego      traZODone  50 mg Oral HS PRN BHAVESH Diego         Risks / Benefits of Treatment:    Risks, benefits, and possible side effects of medications explained to patient and patient verbalizes understanding and agreement for treatment  Counseling / Coordination of Care:      Patient's progress discussed with staff in treatment team meeting  Medications, treatment progress and treatment plan reviewed with patient

## 2021-06-28 NOTE — PROGRESS NOTES
06/28/21 0730   Activity/Group Checklist   Group   (Goal Planning and Communication)   Attendance Attended   Attendance Duration (min) 16-30  (pt in and out of group)   Interactions Interacted appropriately   Affect/Mood Appropriate;Calm   Goals Achieved Identified feelings; Able to listen to others; Able to engage in interactions

## 2021-06-29 ENCOUNTER — TELEPHONE (OUTPATIENT)
Dept: PSYCHIATRY | Facility: CLINIC | Age: 31
End: 2021-06-29

## 2021-06-29 VITALS
RESPIRATION RATE: 18 BRPM | BODY MASS INDEX: 26.18 KG/M2 | SYSTOLIC BLOOD PRESSURE: 127 MMHG | OXYGEN SATURATION: 98 % | HEART RATE: 84 BPM | DIASTOLIC BLOOD PRESSURE: 62 MMHG | HEIGHT: 71 IN | TEMPERATURE: 98.4 F | WEIGHT: 187 LBS

## 2021-06-29 PROBLEM — Z00.8 MEDICAL CLEARANCE FOR PSYCHIATRIC ADMISSION: Status: RESOLVED | Noted: 2021-06-24 | Resolved: 2021-06-29

## 2021-06-29 LAB
DOCUMENTATION: ABNORMAL
FENTANYL UR CFM-MCNC: ABNORMAL PG/ML
FENTANYL UR QL CFM: POSITIVE
FENTANYL+NORFENTANYL PNL UR: NORMAL PG/ML
FENTANYL+NORFENTANYL UR QL CFM: POSITIVE
NORFENTANYL UR CFM-MCNC: ABNORMAL PG/ML
NORFENTANYL UR QL CFM: POSITIVE

## 2021-06-29 PROCEDURE — 99238 HOSP IP/OBS DSCHRG MGMT 30/<: CPT | Performed by: NURSE PRACTITIONER

## 2021-06-29 RX ORDER — PALIPERIDONE 3 MG/1
3 TABLET, EXTENDED RELEASE ORAL DAILY
Qty: 30 TABLET | Refills: 1 | Status: SHIPPED | OUTPATIENT
Start: 2021-06-30 | End: 2021-07-30

## 2021-06-29 RX ORDER — ESCITALOPRAM OXALATE 5 MG/1
5 TABLET ORAL DAILY
Qty: 30 TABLET | Refills: 1 | Status: SHIPPED | OUTPATIENT
Start: 2021-06-30 | End: 2021-07-30

## 2021-06-29 RX ORDER — GABAPENTIN 300 MG/1
300 CAPSULE ORAL 2 TIMES DAILY
Qty: 60 CAPSULE | Refills: 1 | Status: SHIPPED | OUTPATIENT
Start: 2021-06-29 | End: 2021-07-29

## 2021-06-29 RX ADMIN — ESCITALOPRAM 5 MG: 5 TABLET, FILM COATED ORAL at 08:03

## 2021-06-29 RX ADMIN — PALIPERIDONE 3 MG: 3 TABLET, EXTENDED RELEASE ORAL at 08:03

## 2021-06-29 RX ADMIN — NICOTINE 1 PATCH: 21 PATCH, EXTENDED RELEASE TRANSDERMAL at 08:05

## 2021-06-29 RX ADMIN — GABAPENTIN 300 MG: 300 CAPSULE ORAL at 08:03

## 2021-06-29 RX ADMIN — CLONIDINE HYDROCHLORIDE 0.1 MG: 0.1 TABLET ORAL at 08:03

## 2021-06-29 RX ADMIN — HYDROXYZINE HYDROCHLORIDE 50 MG: 25 TABLET, FILM COATED ORAL at 06:24

## 2021-06-29 NOTE — DISCHARGE SUMMARY
Discharge Summary - [facility] Leodan Nova 35 y o  male MRN: [id number]  Unit/Bed#: [facility] 908-63 Encounter: [id number]     Admission Date: 6/23/2021         Discharge Date: 6/29/2021  2:20 PM    Attending Psychiatrist: Julius Perez MD    Reason for Admission/HPI:     Principal Problem:    Schizoaffective disorder, bipolar type [facility])  Active Problems:    Tobacco abuse    Drug abuse and dependence ([facility] [address] )    Natasha Alicea is a 35year old male patient admitted on a voluntary 201 commitment basis due to an intentional heroin overdose  Per Crisis evaluation completed by Crisis Worker Mary Lou Ballardison:    Crisis met with Patient in [facility]  Patient is a 36 y/o male presenting to the ED following an intentional overdose of heroin  Patient said Gia Benoit took his kids because of his heroin abuse and was told he needs to seek treatment in order to get them back  He was clean for 3-6 months prior to relapsing 2-3 weeks ago  He denied any current suicidal/homicidal ideations and denied any visual/auditory hallucinations  Patient said was suicidal last night but not now  He reports increased depression/anxiety and decreased sleep/appetite  He has not had been on medication for 3 months because [facility] was no longer willing to treat him  Patient said he needs dual dx to get clean and to get his children back from [place]  Patient agreed to sign a 201 after rights and detailed explanation of the 72 hr notice were read to and and reviewed with him        Per initial psychiatric evaluation completed by Dr Daniels Laughter:    Charisma Santa is a 35 y o  male with a history of substance use and psychiatric illness in the past who was admitted to the inpatient adult psychiatric unit on a voluntary 201 commitment basis due to depression, unstable mood, delusional thoughts, increased agitation and suicide attempt    Natasha Alicea presented to the ED on June 24th after taking 20 bags of heroin which he reported he did in an attempt to hurt himself because he lost custody of his children  On evaluation in the inpatient psychiatric unit Mecca Rose reports he has a history of schizoaffective disorder and has had periods in the past of delusional thinking with paranoid ideations  He was in the hospital ED in February with indication that he was psychotic at the time, at that time he was transferred to another facility for ongoing care  He reports he was managed on [place] in the past but has not been able to get ongoing injections which have caused him to have exacerbated symptoms of depression and anxiety  Patient states at times he feels very paranoid of people around him, he thinks that people are out to get him or harm him  He reports feeling depressed with low energy, low motivation lack of enjoyment and feeling hopeless at times  He reports he did not have any active thoughts of self-harm until his children were taken from his custody in the last several days and he relapsed into using heroin  He states he then felt suicidal and used 20 bags of heroin to hurt himself  He did however come to the ED for help  Currently patient does not exhibit any signs of psychosis  He reports no current active suicidal or homicidal ideations as he is able to feel safe while on the unit         Hospital Course: The patient was admitted to the inpatient psychiatric unit and started on every 7 minutes precautions  During the hospitalization the patient was attending individual therapy, group therapy, milieu therapy and occupational therapy  Psychiatric medications were titrated over the hospital stay  To address depressive symptoms, mood instability and psychotic symptoms the patient was started on antidepressant Lexapro, mood stabilizer Neurontin and antipsychotic medication Invega  Medication doses were titrated during the hospital course   Prior to beginning of treatment medications risks and benefits and possible side effects including risk of parkinsonian symptoms, Tardive Dyskinesia and metabolic syndrome related to treatment with antipsychotic medications and risk of suicidality and serotonin syndrome related to treatment with antidepressants were reviewed with the patient  The patient verbalized understanding and agreement for treatment  Patient's symptoms improved gradually over the hospital course  At the end of treatment the patient was doing well  Mood was stable at the time of discharge  The patient denied suicidal ideation, intent or plan at the time of discharge and denied homicidal ideation, intent or plan at the time of discharge  There was no overt psychosis at the time of discharge  Sleep and appetite were improved  The patient was tolerating medications and was not reporting any significant side effects at the time of discharge  Since Antonio Boucher was doing well at the end of the hospitalization, treatment team felt that he could be safely discharged to outpatient care  The outpatient follow up with therapist and psychiatrist at [facility] was arranged by the unit  upon discharge  Mental Status at time of Discharge:     Appearance:  age appropriate   Behavior:  normal   Speech:  normal pitch and normal volume   Mood:  normal   Affect:  normal   Thought Process:  normal   Thought Content:  normal   Perceptual Disturbances: None   Risk Potential: Patient denies any suicidal or homicidal ideations     Sensorium:  person, place, time/date and situation   Cognition:  recent and remote memory grossly intact   Consciousness:  alert and awake    Attention: attention span and concentration were age appropriate   Insight:  fair   Judgment: fair   Gait/Station: normal gait/station and normal balance   Motor Activity: no abnormal movements     Admission Diagnosis:Suicidal ideation [R45 851]  Drug abuse ([facility] [address] ) [F19 10]  Heroin abuse ([facility] ) [F11 10]  Depression with suicidal ideation [F32 9, R45 851]    Discharge Diagnosis:   Principal Problem:    Schizoaffective disorder, bipolar type ([facility] [address] )  Active Problems:    Tobacco abuse    Drug abuse and dependence ([facility] )  Resolved Problems:    Medical clearance for psychiatric admission        Lab results:  Admission on 06/23/2021, Discharged on 06/29/2021   Component Date Value    Amph/Meth UR 06/24/2021 Negative     Barbiturate Ur 06/24/2021 Negative     Benzodiazepine Urine 06/24/2021 Negative     Cocaine Urine 06/24/2021 Negative     Methadone Urine 06/24/2021 Negative     Opiate Urine 06/24/2021 Negative     PCP Ur 06/24/2021 Negative     THC Urine 06/24/2021 Positive*    Oxycodone Urine 06/24/2021 Negative     EXTBreath Alcohol 06/24/2021 Negative     SARS-CoV-2 06/24/2021 Negative     TSH 3RD GENERATON 06/25/2021 1 860     Sodium 06/25/2021 139     Potassium 06/25/2021 3 9     Chloride 06/25/2021 104     CO2 06/25/2021 28     ANION GAP 06/25/2021 7     BUN 06/25/2021 10     Creatinine 06/25/2021 0 80     Glucose 06/25/2021 90     Glucose, Fasting 06/25/2021 90     Calcium 06/25/2021 8 8     AST 06/25/2021 17     ALT 06/25/2021 19     Alkaline Phosphatase 06/25/2021 61     Total Protein 06/25/2021 6 8     Albumin 06/25/2021 3 9     Total Bilirubin 06/25/2021 0 30     eGFR 06/25/2021 121     WBC 06/25/2021 9 00     RBC 06/25/2021 4 60     Hemoglobin 06/25/2021 13 2*    Hematocrit 06/25/2021 40 0*    MCV 06/25/2021 87     MCH 06/25/2021 28 6     MCHC 06/25/2021 32 9     RDW 06/25/2021 14 8*    MPV 06/25/2021 7 0*    Platelets 74/05/4274 549*    Neutrophils Relative 06/25/2021 56     Lymphocytes Relative 06/25/2021 32     Monocytes Relative 06/25/2021 9     Eosinophils Relative 06/25/2021 2     Basophils Relative 06/25/2021 1     Neutrophils Absolute 06/25/2021 5 10     Lymphocytes Absolute 06/25/2021 2 80     Monocytes Absolute 06/25/2021 0 80     Eosinophils Absolute 06/25/2021 0 20     Basophils Absolute 06/25/2021 0 10        Discharge Medications:  Discharge Medication List as of 6/29/2021 11:37 AM      START taking these medications    Details   escitalopram (LEXAPRO) 5 mg tablet Take 1 tablet (5 mg total) by mouth daily, Starting Wed 6/30/2021, Until Fri 7/30/2021, Normal      gabapentin (NEURONTIN) 300 mg capsule Take 1 capsule (300 mg total) by mouth 2 (two) times a day, Starting Tue 6/29/2021, Until Thu 7/29/2021, Normal      paliperidone (INVEGA) 3 mg 24 hr tablet Take 1 tablet (3 mg total) by mouth daily, Starting Wed 6/30/2021, Until Fri 7/30/2021, Normal            Discharge Medication List as of 6/29/2021 11:37 AM         Discharge Medication List as of 6/29/2021 11:37 AM         Discharge Medication List as of 6/29/2021 11:37 AM           Discharge instructions/Information to patient and family:   See after visit summary for information provided to patient and family  Provisions for Follow-Up Care:  See after visit summary for information related to follow-up care and any pertinent home health orders  Discharge Statement   I spent 30 minutes discharging the patient  This time was spent on the day of discharge  I had direct contact with the patient on the day of discharge  Additional documentation is required if more than 30 minutes were spent on discharge  I reviewed with Albert Hairston importance of compliance with medications and outpatient treatment after discharge

## 2021-06-29 NOTE — PROGRESS NOTES
06/29/21 0800   Team Meeting   Meeting Type Daily Rounds   Initial Conference Date 06/29/21   Team Members Present   Team Members Present Physician;Nurse;   Physician Team Member Dr Ryan Mooney; BHAVESH Erickson; BHAVESH Allison   Nursing Team Member Brown Pinto RN   Social Work Team Member Fermin Pantoja   Patient/Family Present   Patient Present No   Patient's Family Present No     Fixated on discharge which is ok for today  Signed 72 hour notice yesterday morning  Medications will be [facility] at [facility]  Reports he is losing his SSI and insurance on July 1 and being evicted from his apartment

## 2021-06-29 NOTE — PROGRESS NOTES
06/29/21 0730   Activity/Group Checklist   Group   (Goal Planning and Communication)   Attendance Attended   Attendance Duration (min) 46-60   Interactions Interacted appropriately   Affect/Mood Appropriate;Bright;Calm   Goals Achieved Identified feelings; Identified triggers; Discussed coping strategies; Able to listen to others; Able to engage in interactions; Able to manage/cope with feelings

## 2021-06-29 NOTE — SOCIAL WORK
CM informed that patient's [employer] requires prior authorization by [facility]  CM completed online prior auth request  CM will continue to follow patient's progress and assist with discharge planning needs

## 2021-06-29 NOTE — NURSING NOTE
Patient cooperative , concerned about his discharge plan and how his future will be after discharge , he denies any depression, anxiety or SI, able to make needs known, pleasant and cooperative, approachable, social and compliant with medications  positive for pm snack  Q7 minute checks continued ,  continue to monitor

## 2021-06-29 NOTE — PROGRESS NOTES
Patient is being discharged with the following belongings     tshirt   Cargo shorts   Cell phone x2  Ear buds   External battery   [address] with cord       Patient was present and signed belonging sheet

## 2021-06-29 NOTE — NURSING NOTE
As per primary RN  Patient belongings inventoried and packed  Discharge instructions reviewed  Pt verbalized understanding

## 2021-06-29 NOTE — NURSING NOTE
Patient visible in milieu, pleasant and cooperative in interaction  Social with staff and peers  Affect appropriate to circumstances  Patient denies anxiety/depression, SI/HI, hallucinations  Remains medication compliant and on 7" checks for safety and behaviors

## 2021-06-29 NOTE — SOCIAL WORK
CM received call from provider stating she was informed by Jeimy Starr that patient's insurance will not cover [place]  CM then called and left VM for the pharmacy staff requesting call back to investigate this issue further  CM will continue to follow patient's progress and assist with discharge planning needs

## 2021-06-29 NOTE — DISCHARGE INSTRUCTIONS
Schizoaffective Disorder   WHAT YOU NEED TO KNOW:   Schizoaffective disorder is a long-term mental illness that may change how you think, feel, and act around others  You may not know what is real and what is not real    DISCHARGE INSTRUCTIONS:   Medicines:   · Antipsychotics: These medicines help decrease psychotic symptoms or severe agitation  You may need antiparkinson medicine to control muscle stiffness, twitches, and restlessness caused by antipsychotic medicines  · Antianxiety medicine: This medicine may be given to decrease anxiety and help you feel calm and relaxed  · Antidepressants: These medicines are given to decrease or stop the symptoms of depression, anxiety, and behavior problems  · Mood stabilizers: These medicines help control mood swings  · Anticonvulsants: This medicine is given to control seizures  It may also be used to decrease violent behavior and control your mood swings  · Blood pressure medicines: These may be used to help decrease motor tics (uncontrolled movements)  They may also help you feel calmer, more focused, and less irritable  · Anticholinergics: This medicine decreases the side effects of other medicines  · Take your medicine as directed  Contact your healthcare provider if you think your medicine is not helping or if you have side effects  Tell him or her if you are allergic to any medicine  Keep a list of the medicines, vitamins, and herbs you take  Include the amounts, and when and why you take them  Bring the list or the pill bottles to follow-up visits  Carry your medicine list with you in case of an emergency  Follow up with your healthcare provider or psychiatrist as directed: You may need to return to have your blood pressure and other symptoms checked  You may need blood tests to check the level of medicine in your blood  Write down your questions so you remember to ask them during your visits  Manage your symptoms:   The following may help you feel better or prevent symptoms of schizoaffective disorder from coming back:  · Find support for yourself and your family:  Talk with others to help you cope with your illness better  This may also help to improve how you relate to others  · Keep all medical appointments: This will help manage your disease and the side-effects from medicines you may be taking  · Use your medicines as directed:  Put your medicines in a pillbox placed in an area you can easily see  Use a watch with an alarm to help you remember when it is time to take your medicine  Tell your healthcare provider if you know or think you might be pregnant  Do not stop taking your medicines without your healthcare provider's okay  A sudden stop can cause serious medical problems  · Watch for early signs of a relapse and seek help immediately:      ? How you think, feel, and see things has changed  ? You behave differently than usual     ? You become more nervous and upset, but do not know why     ? You eat less and have trouble sleeping  ? You have little or no interest in friends or activities  For support and more information:   · [employer]  [address] [facility] [place], [facility] [address] , [address]  Phone: [phone]  Phone: [phone]  Web Address: [employer] br  org    · [address] [facility], Office of Public [employer]  [address] [place] [address] [address], [address]  [place], [place] [address]   Phone: [phone]  Phone: [phone]  Web Address: [employer] tn  Contact your healthcare provider or psychiatrist if:   · You think you are having a relapse  · You are having side effects from your medicine, or they are not helping      · You are not sleeping well or are sleeping more than usual     · You cannot eat or are eating more than usual     · You have muscle spasms, stiffness, or trouble walking  · Your sad feelings or thoughts change the way you function during the day  · You have questions or concerns about your condition or care  Seek care immediately or call 911 if:   · You feel like hurting or killing yourself or others  · You feel that your condition is getting worse  · You feel very upset, threaten someone, or you feel violent  · You suddenly have changes in your vision  · You suddenly have chest pain, trouble breathing, or a fever  © Copyright [address] Information is for End User's use only and may not be sold, redistributed or otherwise used for commercial purposes  All illustrations and images included in CareNotes® are the copyrighted property of A D A M , Inc  or [facility] [place][note 2] Linda Ramos   The above information is an  only  It is not intended as medical advice for individual conditions or treatments  Talk to your doctor, nurse or pharmacist before following any medical regimen to see if it is safe and effective for you  Escitalopram (By mouth)   Escitalopram ([id number]-pram)  Treats depression and generalized anxiety disorder (VITALY)  Brand Name(s): Lexapro   There may be other brand names for this medicine  When This Medicine Should Not Be Used: This medicine is not right for everyone  Do not use it if you had an allergic reaction to escitalopram or citalopram   How to Use This Medicine:   Liquid, Tablet  · Take this medicine as directed  You may need to take it for a month or more before you feel better  Your dose may need to be changed to find out what works best for you  · Measure the oral liquid medicine with a marked measuring spoon, oral syringe, or medicine cup  · This medicine should come with a Medication Guide  Ask your pharmacist for a copy if you do not have one  · Missed dose: Take a dose as soon as you remember  If it is almost time for your next dose, wait until then and take a regular dose   Do not take extra medicine to make up for a missed dose  · Store the medicine in a closed container at room temperature, away from heat, moisture, and direct light  Drugs and Foods to Avoid:   Ask your doctor or pharmacist before using any other medicine, including over-the-counter medicines, vitamins, and herbal products  · Do not use this medicine together with pimozide  Do not use this medicine and an MAO inhibitor (MAOI) within 14 days of each other  · Some medicines can affect how escitalopram works  Tell your doctor if you are using the following:   ? Buspirone, carbamazepine, cimetidine, fentanyl, lithium, Tayo's wort, tramadol, or tryptophan supplements  ? Amphetamines  ? Blood thinner (including warfarin)  ? Diuretic (water pill)  ? NSAID pain or arthritis medicine (including aspirin, ibuprofen, naproxen)  ? Triptan medicine to treat migraine headaches (including sumatriptan)  · Tell your doctor if you use anything else that makes you sleepy  Some examples are allergy medicine, narcotic pain medicine, and alcohol  · Do not drink alcohol while you are using this medicine  Warnings While Using This Medicine:   · Tell your doctor if you are pregnant or breastfeeding, or if you have kidney disease, liver disease, bleeding problems, glaucoma, heart disease, or a seizure disorder  · For some children, teenagers, and young adults, this medicine may increase mental or emotional problems  This may lead to thoughts of suicide and violence  Talk with your doctor right away if you have any thoughts or behavior changes that concern you  Tell your doctor if you or anyone in your family has a history of bipolar disorder or suicide attempts  · This medicine may cause the following problems:   ? Serotonin syndrome (more likely when taken with certain medicines)  ? Low sodium levels  ? Increased risk of bleeding problems  · This medicine may make you dizzy or drowsy   Do not drive or do anything that could be dangerous until you know how this medicine affects you  · Your doctor may want to monitor your child's weight and height, because this medicine may cause decreased appetite and weight loss in children  · Do not stop using this medicine suddenly  Your doctor will need to slowly decrease your dose before you stop it completely  · Your doctor will check your progress and the effects of this medicine at regular visits  Keep all appointments  · Keep all medicine out of the reach of children  Never share your medicine with anyone  Possible Side Effects While Using This Medicine:   Call your doctor right away if you notice any of these side effects:  · Allergic reaction: Itching or hives, swelling in your face or hands, swelling or tingling in your mouth or throat, chest tightness, trouble breathing  · Anxiety, restlessness, fever, sweating, muscle spasms, nausea, vomiting, diarrhea, seeing or hearing things that are not there  · Confusion, weakness, and muscle twitching  · Eye pain, vision changes, seeing halos around lights  · Fast, pounding, or uneven heartbeat  · Feeling more excited or energetic than usual, racing thoughts, trouble sleeping  · Seizures  · Thoughts of hurting yourself or others, unusual behavior  · Unusual bleeding or bruising  If you notice these less serious side effects, talk with your doctor:   · Dizziness, drowsiness, or sleepiness  · Dry mouth  · Headache  · Nausea, constipation, diarrhea  · Sexual problems  If you notice other side effects that you think are caused by this medicine, tell your doctor  Call your doctor for medical advice about side effects  You may report side effects to FDA at 8-431-FDA-0442  © Copyright [employer] [facility] 2021 Information is for End User's use only and may not be sold, redistributed or otherwise used for commercial purposes  The above information is an  only  It is not intended as medical advice for individual conditions or treatments   Talk to your doctor, nurse or pharmacist before following any medical regimen to see if it is safe and effective for you  Paliperidone (By mouth)   Paliperidone (pal-ee-PER-i-done)  Treats schizophrenia and schizoaffective disorder  Brand Name(s): Invega   There may be other brand names for this medicine  When This Medicine Should Not Be Used: This medicine is not right for everyone  Do not use it if you had an allergic reaction to paliperidone or risperidone  How to Use This Medicine:   Long Acting Tablet  · Take your medicine as directed  Your dose may need to be changed several times to find what works best for you  · Swallow the extended-release tablet whole  Do not crush, break, or chew it  Swallow the tablet with a liquid  · If you take the extended-release tablet, part of the tablet may pass into your stools  This is normal and is nothing to worry about  · Missed dose: Call your doctor or pharmacist for instructions  · Store the medicine in a closed container at room temperature, away from heat, moisture, and direct light  Drugs and Foods to Avoid:   Ask your doctor or pharmacist before using any other medicine, including over-the-counter medicines, vitamins, and herbal products  · Some medicines can affect how paliperidone works  Tell your doctor if you are using any of the following:  ? Carbamazepine, divalproex sodium, levodopa, paroxetine  ? Antibiotic medicine (including gatifloxacin, moxifloxacin)  ? Blood pressure medicine  ? Medicine for heart rhythm problems (including amiodarone, procainamide, quinidine, sotalol)  ? Medicine for mental illness (including chlorpromazine, thioridazine)  · Do not drink alcohol while you are using this medicine  · Tell your doctor if you use anything else that makes you sleepy  Some examples are allergy medicine, narcotic pain medicine, and alcohol    Warnings While Using This Medicine:   · Tell your doctor if you are pregnant or breastfeeding, or if you have kidney disease, liver disease, diabetes, stomach or bowel problems (including blockage), Reye syndrome, brain tumor, Parkinson disease, dementia, trouble swallowing, or a history of breast cancer, seizures, or neuroleptic malignant syndrome  Tell your doctor if you have blood vessel or heart problems, heart failure, heart rhythm problems, low blood pressure, or a history of a heart attack or stroke  · This medicine may cause the following problems:  ? Increased risk of stroke  ? Neuroleptic malignant syndrome (a nerve disorder that could be life-threatening)  ? Heart rhythm problem (including QT prolongation)  ? Tardive dyskinesia (a muscle disorder that could become permanent)  ? High blood sugar or high cholesterol levels  ? Increased levels of prolactin hormone  ? Increased risk of seizures  · This medicine may make you dizzy or drowsy, or may cause trouble with thinking or controlling body movements, which may lead to falls, fractures, or other injuries  Do not drive or do anything else that could be dangerous until you know how this medicine affects you  You may also feel lightheaded when suddenly getting up from a lying or sitting position, so stand up slowly  · This medicine lowers the number of white blood cells  This weakens your immune system, so you may get infections more easily  Wash your hands often  Avoid people who are sick  · This medicine may change how your body regulates temperature  Avoid activities that could cause you to become very cold, hot, or dehydrated  · Talk with your doctor before using this medicine if you plan to have children  Some women who use this medicine have become infertile (unable to have children)  · Your doctor will do lab tests at regular visits to check on the effects of this medicine  Keep all appointments  · Keep all medicine out of the reach of children  Never share your medicine with anyone    Possible Side Effects While Using This Medicine:   Call your doctor right away if you notice any of these side effects:  · Allergic reaction: Itching or hives, swelling in your face or hands, swelling or tingling in your mouth or throat, chest tightness, trouble breathing  · Chills, cough, sore throat, body aches  · Difficulty swallowing (that can cause food or liquid to get into your lungs)  · Fast, slow, pounding, or uneven heartbeat  · Fever, confusion, sweating, muscle stiffness, loss of consciousness, troubled breathing  · Jerky muscle movements you cannot control (often in your face, tongue, or jaw)  · Lightheadedness, dizziness, fainting  · Numbness or weakness in your arm or leg, or on one side of your body  · Painful, prolonged erection of the penis, which lasts for more than 4 hours  · Sudden or severe headache, problems with vision, speech, or walking  · Tremors or seizures  · Unusual bleeding, bruising, or weakness  If you notice these less serious side effects, talk with your doctor:   · Constipation  · Mild headache  · Nausea, vomiting, stomach pain  · Sleepiness or unusual drowsiness  · Swelling of the breasts or unusual milk production  · Weight gain  If you notice other side effects that you think are caused by this medicine, tell your doctor  Call your doctor for medical advice about side effects  You may report side effects to FDA at 2-042-FDA-7082  © Copyright [facility] 2021 Information is for End User's use only and may not be sold, redistributed or otherwise used for commercial purposes  The above information is an  only  It is not intended as medical advice for individual conditions or treatments  Talk to your doctor, nurse or pharmacist before following any medical regimen to see if it is safe and effective for you

## 2021-06-29 NOTE — SOCIAL WORK
Patient signed 72 hour notice yesterday  and patient will be discharged home today  CM informed patient who stated he would be able to reach out to his significant other, Tisha López, and ask her for a ride  Patient did inquire if the hospital could get him a ride home to which CM informed him that a taxi would be available and then inquired if he had money to pay for the taxi  Patient denied having any money to pay for a ride  CM will discuss with PCM regarding this concern  CM also called and left VM for [facility] ([phone]) regarding patient's need for a follow up appointment and requested call back as soon as possible  CM will continue to follow patient's progress and assist with discharge planning needs

## 2021-06-29 NOTE — BH TRANSITION RECORD
Contact Information: If you have any questions, concerns, pended studies, tests and/or procedures, or emergencies regarding your inpatient behavioral health visit  Please contact Ang[facility]lica Couch[facility] behavioral health unit [phone] and ask to speak to a , nurse or physician  A contact is available 24 hours/ 7 days a week at this number  Summary of Procedures Performed During your Stay:  Below is a list of major procedures performed during your hospital stay and a summary of results:  - No major procedures performed  Pending Studies (From admission, onward)     Start     Ordered    06/24/21 1013  FENTANYL, URINE  Once      06/24/21 1012              If studies are pending at discharge, follow up with your PCP and/or referring provider

## 2021-06-29 NOTE — PLAN OF CARE
Problem: Alteration in Thoughts and Perception  Goal: Treatment Goal: Gain control of psychotic behaviors/thinking, reduce/eliminate presenting symptoms and demonstrate improved reality functioning upon discharge  Outcome: Adequate for Discharge  Goal: Refrain from acting on delusional thinking/internal stimuli  Description: Interventions:  - Monitor patient closely, per order   - Utilize least restrictive measures   - Set reasonable limits, give positive feedback for acceptable   - Administer medications as ordered and monitor of potential side effects  Outcome: Adequate for Discharge  Goal: Agree to be compliant with medication regime, as prescribed and report medication side effects  Description: Interventions:  - Offer appropriate PRN medication and supervise ingestion; conduct AIMS, as needed   Outcome: Adequate for Discharge  Goal: Recognize dysfunctional thoughts, communicate reality-based thoughts at the time of discharge  Description: Interventions:  - Provide medication and psycho-education to assist patient in compliance and developing insight into his/her illness   Outcome: Adequate for Discharge     Problem: Ineffective Coping  Goal: Cooperates with admission process  Description: Interventions:   - Complete admission process  Outcome: Adequate for Discharge  Goal: Identifies ineffective coping skills  Outcome: Adequate for Discharge  Goal: Identifies healthy coping skills  Outcome: Adequate for Discharge  Goal: Demonstrates healthy coping skills  Outcome: Adequate for Discharge  Goal: Participates in unit activities  Description: Interventions:  - Provide therapeutic environment   - Provide required programming   - Redirect inappropriate behaviors   Outcome: Adequate for Discharge  Goal: Patient/Family participate in treatment and DC plans  Description: Interventions:  - Provide therapeutic environment  Outcome: Adequate for Discharge  Goal: Patient/Family verbalizes awareness of resources  Outcome: Adequate for Discharge  Goal: Understands least restrictive measures  Description: Interventions:  - Utilize least restrictive behavior  Outcome: Adequate for Discharge  Goal: Free from restraint events  Description: - Utilize least restrictive measures   - Provide behavioral interventions   - Redirect inappropriate behaviors   Outcome: Adequate for Discharge     Problem: Risk for Self Injury/Neglect  Goal: Treatment Goal: Remain safe during length of stay, learn and adopt new coping skills, and be free of self-injurious ideation, impulses and acts at the time of discharge  Outcome: Adequate for Discharge  Goal: Refrain from harming self  Description: Interventions:  - Monitor patient closely, per order  - Develop a trusting relationship  - Supervise medication ingestion, monitor effects and side effects   Outcome: Adequate for Discharge  Goal: Recognize maladaptive responses and adopt new coping mechanisms  Outcome: Adequate for Discharge     Problem: Depression  Goal: Treatment Goal: Demonstrate behavioral control of depressive symptoms, verbalize feelings of improved mood/affect, and adopt new coping skills prior to discharge  Outcome: Adequate for Discharge  Goal: Verbalize thoughts and feelings  Description: Interventions:  - Assess and re-assess patient's level of risk   - Engage patient in 1:1 interactions, daily, for a minimum of 15 minutes   - Encourage patient to express feelings, fears, frustrations, hopes   Outcome: Adequate for Discharge  Goal: Refrain from harming self  Description: Interventions:  - Monitor patient closely, per order   - Supervise medication ingestion, monitor effects and side effects   Outcome: Adequate for Discharge  Goal: Refrain from isolation  Description: Interventions:  - Develop a trusting relationship   - Encourage socialization   Outcome: Adequate for Discharge  Goal: Refrain from self-neglect  Outcome: Adequate for Discharge  Goal: Complete daily ADLs, including personal hygiene independently, as able  Description: Interventions:  - Observe, teach, and assist patient with ADLS  -  Monitor and promote a balance of rest/activity, with adequate nutrition and elimination   Outcome: Adequate for Discharge     Problem: Anxiety  Goal: Anxiety is at manageable level  Description: Interventions:  - Assess and monitor patient's anxiety level  - Monitor for signs and symptoms (heart palpitations, chest pain, shortness of breath, headaches, nausea, feeling jumpy, restlessness, irritable, apprehensive)  - Collaborate with interdisciplinary team and initiate plan and interventions as ordered  - [place] patient to unit/surroundings  - Explain treatment plan  - Encourage participation in care  - Encourage verbalization of concerns/fears  - Identify coping mechanisms  - Assist in developing anxiety-reducing skills  - Administer/offer alternative therapies  - Limit or eliminate stimulants  Outcome: Adequate for Discharge     Problem: Anxiety  Goal: Anxiety is at manageable level  Description: Interventions:  - Assess and monitor patient's anxiety level  - Monitor for signs and symptoms (heart palpitations, chest pain, shortness of breath, headaches, nausea, feeling jumpy, restlessness, irritable, apprehensive)  - Collaborate with interdisciplinary team and initiate plan and interventions as ordered    - [place] patient to unit/surroundings  - Explain treatment plan  - Encourage participation in care  - Encourage verbalization of concerns/fears  - Identify coping mechanisms  - Assist in developing anxiety-reducing skills  - Administer/offer alternative therapies  - Limit or eliminate stimulants  Outcome: Adequate for Discharge     Problem: Risk for Violence/Aggression Toward Others  Goal: Treatment Goal: Refrain from acts of violence/aggression during length of stay, and demonstrate improved impulse control at the time of discharge  Outcome: Adequate for Discharge  Goal: Refrain from harming others  Outcome: Adequate for Discharge  Goal: Refrain from destructive acts on the environment or property  Outcome: Adequate for Discharge  Goal: Control angry outbursts  Description: Interventions:  - Monitor patient closely, per order  - Ensure early verbal de-escalation  - Monitor prn medication needs  - Set reasonable/therapeutic limits, outline behavioral expectations, and consequences   - Provide a non-threatening milieu, utilizing the least restrictive interventions   Outcome: Adequate for Discharge  Goal: Identify appropriate positive anger management techniques  Description: Interventions:  - Offer anger management and coping skills groups   - Staff will provide positive feedback for appropriate anger control  Outcome: Adequate for Discharge     Problem: DISCHARGE PLANNING  Goal: Discharge to home or other facility with appropriate resources  Description: INTERVENTIONS:  - Identify barriers to discharge w/patient and caregiver  - Arrange for needed discharge resources and transportation as appropriate  - Identify discharge learning needs (meds, wound care, etc )  - Arrange for interpretive services to assist at discharge as needed  - Refer to Case Management Department for coordinating discharge planning if the patient needs post-hospital services based on physician/advanced practitioner order or complex needs related to functional status, cognitive ability, or social support system  Outcome: Adequate for Discharge bed rails

## 2021-06-30 NOTE — SOCIAL WORK
CM received prior auth approval from [employer] approving patient's Invega medication  CM will follow up as needed

## 2021-09-10 ENCOUNTER — PATIENT OUTREACH (OUTPATIENT)
Dept: OTHER | Facility: OTHER | Age: 31
End: 2021-09-10

## 2021-09-10 NOTE — PROGRESS NOTES
September 10, 2021    [facility] nurses received call from Kristie Vora from [facility] regarding client and his wife  Kristie Vora explained that he and wife are living in car and are in need of resources  [place] nurses called wife Romie Tang and spoke with client  Client stated that he needs his medication invega (paliperidone)  [place] nurses called [facility] pharmacy that client uses and were told that pre-authorization is needed  [place] nurses called Tanvi Baldwin to inquire about preauthorization and price  Script is needed to run  [address] for assistance as client is within [facility] system  [place] nurses called [facility] ER on [address] to inquire if pre-authorization could occur since client was seen in the ER  MD who saw client is not currently working or on call  [place] nurses called Shaunna Martino, nurse manager from [facility] as client has information on chart that may indicate client is a  and is within [facility]  He and team told [facility] [address] that they would look into assisting the client and give him a call to follow up on progress

## 2021-09-29 ENCOUNTER — HOSPITAL ENCOUNTER (EMERGENCY)
Facility: HOSPITAL | Age: 31
Discharge: HOME | End: 2021-09-29
Attending: EMERGENCY MEDICINE | Admitting: EMERGENCY MEDICINE
Payer: COMMERCIAL

## 2021-09-29 ENCOUNTER — HOSPITAL ENCOUNTER (EMERGENCY)
Facility: HOSPITAL | Age: 31
Discharge: HOME | End: 2021-09-30
Attending: EMERGENCY MEDICINE
Payer: COMMERCIAL

## 2021-09-29 VITALS
SYSTOLIC BLOOD PRESSURE: 130 MMHG | RESPIRATION RATE: 18 BRPM | OXYGEN SATURATION: 98 % | DIASTOLIC BLOOD PRESSURE: 81 MMHG | WEIGHT: 174.16 LBS | HEART RATE: 103 BPM | TEMPERATURE: 97.4 F | BODY MASS INDEX: 24.29 KG/M2

## 2021-09-29 DIAGNOSIS — F19.10 POLYSUBSTANCE ABUSE (HCC): ICD-10-CM

## 2021-09-29 DIAGNOSIS — Z76.0 ENCOUNTER FOR MEDICATION REFILL: Primary | ICD-10-CM

## 2021-09-29 DIAGNOSIS — R44.3 HALLUCINATIONS: Primary | ICD-10-CM

## 2021-09-29 DIAGNOSIS — Z72.0 TOBACCO ABUSE: ICD-10-CM

## 2021-09-29 DIAGNOSIS — F25.0 SCHIZOAFFECTIVE DISORDER, BIPOLAR TYPE (HCC): ICD-10-CM

## 2021-09-29 LAB — ETHANOL EXG-MCNC: 0 MG/DL

## 2021-09-29 PROCEDURE — 99284 EMERGENCY DEPT VISIT MOD MDM: CPT

## 2021-09-29 PROCEDURE — U0005 INFEC AGEN DETEC AMPLI PROBE: HCPCS | Performed by: EMERGENCY MEDICINE

## 2021-09-29 PROCEDURE — U0003 INFECTIOUS AGENT DETECTION BY NUCLEIC ACID (DNA OR RNA); SEVERE ACUTE RESPIRATORY SYNDROME CORONAVIRUS 2 (SARS-COV-2) (CORONAVIRUS DISEASE [COVID-19]), AMPLIFIED PROBE TECHNIQUE, MAKING USE OF HIGH THROUGHPUT TECHNOLOGIES AS DESCRIBED BY CMS-2020-01-R: HCPCS | Performed by: EMERGENCY MEDICINE

## 2021-09-29 PROCEDURE — 82075 ASSAY OF BREATH ETHANOL: CPT | Performed by: EMERGENCY MEDICINE

## 2021-09-29 PROCEDURE — 99285 EMERGENCY DEPT VISIT HI MDM: CPT

## 2021-09-29 PROCEDURE — 99284 EMERGENCY DEPT VISIT MOD MDM: CPT | Performed by: EMERGENCY MEDICINE

## 2021-09-29 PROCEDURE — 99282 EMERGENCY DEPT VISIT SF MDM: CPT | Performed by: EMERGENCY MEDICINE

## 2021-09-29 RX ORDER — METHADONE HYDROCHLORIDE 10 MG/ML
60 CONCENTRATE ORAL ONCE
Status: COMPLETED | OUTPATIENT
Start: 2021-09-29 | End: 2021-09-29

## 2021-09-29 RX ORDER — METHADONE HYDROCHLORIDE 10 MG/1
50 TABLET ORAL ONCE
Status: DISCONTINUED | OUTPATIENT
Start: 2021-09-29 | End: 2021-09-29

## 2021-09-29 RX ADMIN — METHADONE HYDROCHLORIDE 60 MG: 10 CONCENTRATE ORAL at 15:58

## 2021-09-30 VITALS
HEART RATE: 77 BPM | OXYGEN SATURATION: 99 % | DIASTOLIC BLOOD PRESSURE: 81 MMHG | TEMPERATURE: 98.3 F | RESPIRATION RATE: 18 BRPM | SYSTOLIC BLOOD PRESSURE: 133 MMHG

## 2021-09-30 LAB
AMPHETAMINES SERPL QL SCN: POSITIVE
BARBITURATES UR QL: NEGATIVE
BENZODIAZ UR QL: NEGATIVE
COCAINE UR QL: POSITIVE
METHADONE UR QL: POSITIVE
OPIATES UR QL SCN: POSITIVE
OXYCODONE+OXYMORPHONE UR QL SCN: NEGATIVE
PCP UR QL: NEGATIVE
SARS-COV-2 RNA RESP QL NAA+PROBE: NEGATIVE
THC UR QL: POSITIVE

## 2021-09-30 PROCEDURE — 80307 DRUG TEST PRSMV CHEM ANLYZR: CPT | Performed by: EMERGENCY MEDICINE

## 2021-09-30 PROCEDURE — NC001 PR NO CHARGE: Performed by: EMERGENCY MEDICINE

## 2021-09-30 RX ORDER — ACETAMINOPHEN 325 MG/1
650 TABLET ORAL EVERY 8 HOURS PRN
Status: DISCONTINUED | OUTPATIENT
Start: 2021-09-30 | End: 2021-09-30 | Stop reason: HOSPADM

## 2021-09-30 RX ORDER — NICOTINE 21 MG/24HR
21 PATCH, TRANSDERMAL 24 HOURS TRANSDERMAL ONCE
Status: DISCONTINUED | OUTPATIENT
Start: 2021-09-30 | End: 2021-09-30 | Stop reason: HOSPADM

## 2021-09-30 RX ORDER — HYDROXYZINE HYDROCHLORIDE 25 MG/1
25 TABLET, FILM COATED ORAL EVERY 6 HOURS PRN
Status: DISCONTINUED | OUTPATIENT
Start: 2021-09-30 | End: 2021-09-30 | Stop reason: HOSPADM